# Patient Record
Sex: FEMALE | Race: BLACK OR AFRICAN AMERICAN | NOT HISPANIC OR LATINO
[De-identification: names, ages, dates, MRNs, and addresses within clinical notes are randomized per-mention and may not be internally consistent; named-entity substitution may affect disease eponyms.]

---

## 2021-04-14 ENCOUNTER — NON-APPOINTMENT (OUTPATIENT)
Age: 21
End: 2021-04-14

## 2021-04-14 PROBLEM — Z00.00 ENCOUNTER FOR PREVENTIVE HEALTH EXAMINATION: Status: ACTIVE | Noted: 2021-04-14

## 2021-04-15 ENCOUNTER — APPOINTMENT (OUTPATIENT)
Dept: OTHER | Facility: CLINIC | Age: 21
End: 2021-04-15
Payer: COMMERCIAL

## 2021-04-15 ENCOUNTER — TRANSCRIPTION ENCOUNTER (OUTPATIENT)
Age: 21
End: 2021-04-15

## 2021-04-15 ENCOUNTER — NON-APPOINTMENT (OUTPATIENT)
Age: 21
End: 2021-04-15

## 2021-04-15 ENCOUNTER — APPOINTMENT (OUTPATIENT)
Dept: CARDIOTHORACIC SURGERY | Facility: CLINIC | Age: 21
End: 2021-04-15
Payer: COMMERCIAL

## 2021-04-15 ENCOUNTER — OUTPATIENT (OUTPATIENT)
Dept: INPATIENT UNIT | Facility: HOSPITAL | Age: 21
LOS: 1 days | Discharge: HOME | End: 2021-04-15
Payer: COMMERCIAL

## 2021-04-15 ENCOUNTER — APPOINTMENT (OUTPATIENT)
Dept: PEDIATRIC CARDIOLOGY | Facility: CLINIC | Age: 21
End: 2021-04-15
Payer: COMMERCIAL

## 2021-04-15 ENCOUNTER — APPOINTMENT (OUTPATIENT)
Dept: OBGYN | Facility: CLINIC | Age: 21
End: 2021-04-15
Payer: COMMERCIAL

## 2021-04-15 VITALS
TEMPERATURE: 97.7 F | BODY MASS INDEX: 40.57 KG/M2 | HEIGHT: 63 IN | SYSTOLIC BLOOD PRESSURE: 126 MMHG | DIASTOLIC BLOOD PRESSURE: 90 MMHG | WEIGHT: 229 LBS

## 2021-04-15 VITALS
TEMPERATURE: 97.7 F | BODY MASS INDEX: 40.57 KG/M2 | DIASTOLIC BLOOD PRESSURE: 89 MMHG | SYSTOLIC BLOOD PRESSURE: 129 MMHG | HEART RATE: 116 BPM | WEIGHT: 229 LBS | HEIGHT: 63 IN

## 2021-04-15 VITALS — DIASTOLIC BLOOD PRESSURE: 80 MMHG | SYSTOLIC BLOOD PRESSURE: 127 MMHG | HEART RATE: 109 BPM

## 2021-04-15 VITALS — DIASTOLIC BLOOD PRESSURE: 90 MMHG | SYSTOLIC BLOOD PRESSURE: 126 MMHG

## 2021-04-15 DIAGNOSIS — Z82.49 FAMILY HISTORY OF ISCHEMIC HEART DISEASE AND OTHER DISEASES OF THE CIRCULATORY SYSTEM: ICD-10-CM

## 2021-04-15 DIAGNOSIS — Z83.3 FAMILY HISTORY OF DIABETES MELLITUS: ICD-10-CM

## 2021-04-15 DIAGNOSIS — O26.899 OTHER SPECIFIED PREGNANCY RELATED CONDITIONS, UNSPECIFIED TRIMESTER: ICD-10-CM

## 2021-04-15 DIAGNOSIS — Z78.9 OTHER SPECIFIED HEALTH STATUS: ICD-10-CM

## 2021-04-15 DIAGNOSIS — Z3A.00 WEEKS OF GESTATION OF PREGNANCY NOT SPECIFIED: ICD-10-CM

## 2021-04-15 DIAGNOSIS — H50.10 UNSPECIFIED EXOTROPIA: ICD-10-CM

## 2021-04-15 DIAGNOSIS — A60.00 HERPESVIRAL INFECTION OF UROGENITAL SYSTEM, UNSPECIFIED: ICD-10-CM

## 2021-04-15 DIAGNOSIS — H40.9 UNSPECIFIED GLAUCOMA: ICD-10-CM

## 2021-04-15 PROCEDURE — 99204 OFFICE O/P NEW MOD 45 MIN: CPT

## 2021-04-15 PROCEDURE — 76827 ECHO EXAM OF FETAL HEART: CPT

## 2021-04-15 PROCEDURE — 0501F PRENATAL FLOW SHEET: CPT

## 2021-04-15 PROCEDURE — 76825 ECHO EXAM OF FETAL HEART: CPT

## 2021-04-15 PROCEDURE — 93325 DOPPLER ECHO COLOR FLOW MAPG: CPT | Mod: 59

## 2021-04-15 PROCEDURE — 99205 OFFICE O/P NEW HI 60 MIN: CPT | Mod: 25

## 2021-04-15 PROCEDURE — 76820 UMBILICAL ARTERY ECHO: CPT

## 2021-04-15 PROCEDURE — 76821 MIDDLE CEREBRAL ARTERY ECHO: CPT

## 2021-04-15 PROCEDURE — 59025 FETAL NON-STRESS TEST: CPT | Mod: 26

## 2021-04-15 PROCEDURE — 99072 ADDL SUPL MATRL&STAF TM PHE: CPT

## 2021-04-15 PROCEDURE — 99205 OFFICE O/P NEW HI 60 MIN: CPT | Mod: 95

## 2021-04-15 PROCEDURE — 99215 OFFICE O/P EST HI 40 MIN: CPT | Mod: 25

## 2021-04-15 RX ORDER — PNV/FERROUS SULFATE/FOLIC ACID 27-<0.5MG
TABLET ORAL
Refills: 0 | Status: ACTIVE | COMMUNITY

## 2021-04-15 RX ORDER — CHROMIUM 200 MCG
TABLET ORAL
Refills: 0 | Status: ACTIVE | COMMUNITY

## 2021-04-15 NOTE — OB PROVIDER TRIAGE NOTE - NSOBPROVIDERNOTE_OBGYN_ALL_OB_FT
21y Black female  at 38w5d with normal PEC   No evidence of PEC at this time   Fetus with VSD  D/w Dr Chucrh  Discharge home with instructions  labor precautions  pt to monitor fetal kick counts  pt to follow up with OB as scheduled  Pt to increase Po hydration 21y Black female  at 38w5d with normal PEC   No evidence of PEC at this time   Fetus with VSD  D/w Dr Kapoor  Discharge home with instructions  labor precautions  pt to monitor fetal kick counts  pt to follow up with OB as scheduled  Patient to keep ATU appt scheduled for today  Pt to increase Po hydration

## 2021-04-15 NOTE — OB PROVIDER TRIAGE NOTE - NSHPLABSRESULTS_GEN_ALL_CORE
10.2   10.14 )-----------( 269      ( 2021 00:52 )             31.9         136  |  103  |  3<L>  ----------------------------<  82  3.5   |  20<L>  |  0.62    Ca    9.1      2021 00:52    TPro  6.6  /  Alb  3.4  /  TBili  0.5  /  DBili  x   /  AST  18  /  ALT  5   /  AlkPhos  155<H>      I&O's Detail    PT/INR - ( 2021 00:52 )   PT: 11.7 sec;   INR: 1.03 ratio         PTT - ( 2021 00:52 )  PTT:27.1 sec  Urinalysis Basic - ( 2021 00:52 )    Color: Yellow / Appearance: Clear / S.034 / pH: x  Gluc: x / Ketone: Moderate  / Bili: Small / Urobili: 6 mg/dL   Blood: x / Protein: 100 mg/dL / Nitrite: Negative   Leuk Esterase: Negative / RBC: 2 /HPF / WBC 4 /HPF   Sq Epi: x / Non Sq Epi: 6 /HPF / Bacteria: Few

## 2021-04-15 NOTE — OB PROVIDER TRIAGE NOTE - ADDITIONAL INSTRUCTIONS
D/w Dr Church  Discharge home with instructions  labor precautions  pt to monitor fetal kick counts  pt to follow up with OB as scheduled  Pt to increase Po hydration

## 2021-04-15 NOTE — OB PROVIDER TRIAGE NOTE - HISTORY OF PRESENT ILLNESS
21y Black female  at 38w5d sent to triage from OB for evaluation due to BP of 126/90 with mild headache and lower extremeties swelling.    Pt was a late transfer to [facility] due to fetal cardiac abnormality: Fetus with VSD. Presently pt states headache has resolved.  Denies any visual changes, no epigastric pain, no nausea, no vomiting.  Reports +FM, no vaginal bleeding, no ROM or LOF  Prenatal care: [facility]

## 2021-04-15 NOTE — OB RN TRIAGE NOTE - NSMATERNALFETALCONCERNS_OBGYN_ALL_OB_FT
FETAL ALERT  late transfer of care at 38 weeks secondary to CHD - SEE FETAL ECHO FROM [facility] ( 4/15/2021)  alert NICU

## 2021-04-15 NOTE — OB PROVIDER TRIAGE NOTE - NSMATERNALFETALCONCERNS_OBGYN_ALL_OB_FT
FETAL ALERT  late transfer of care at 38 weeks secondary to CHD - SEE FETAL ECHO FROM [facility] [place] ( 4/15/2021)  alert NICU

## 2021-04-15 NOTE — OB RN TRIAGE NOTE - CHIEF COMPLAINT QUOTE
Pt. was referred from MD's office to R/O PEC . BP at the office was 126/90 with mild headache , protein in the urine and swelling of the lower extremities .

## 2021-04-15 NOTE — OB PROVIDER TRIAGE NOTE - NSHPPHYSICALEXAM_GEN_ALL_CORE
HR: 83 (04-16-21 @ 02:59) (78 - 109)  BP: 111/68 (04-16-21 @ 02:59) (103/57 - 127/80)  RR: 16 (04-15-21 @ 23:55) (16 - 16)    Heart: RRR  Lungs: CTA  Abdomen: Gravid, soft, NT  Lower extremeties: +2 pitting edema    NST: Reactive with moderate variability, Category 1 tracing  [place]: Occasional contractions

## 2021-04-16 ENCOUNTER — ASOB RESULT (OUTPATIENT)
Age: 21
End: 2021-04-16

## 2021-04-16 ENCOUNTER — APPOINTMENT (OUTPATIENT)
Dept: ANTEPARTUM | Facility: CLINIC | Age: 21
End: 2021-04-16
Payer: COMMERCIAL

## 2021-04-16 ENCOUNTER — APPOINTMENT (OUTPATIENT)
Dept: ANTEPARTUM | Facility: HOSPITAL | Age: 21
End: 2021-04-16

## 2021-04-16 VITALS — SYSTOLIC BLOOD PRESSURE: 118 MMHG | DIASTOLIC BLOOD PRESSURE: 67 MMHG | HEART RATE: 109 BPM

## 2021-04-16 DIAGNOSIS — Z98.890 OTHER SPECIFIED POSTPROCEDURAL STATES: Chronic | ICD-10-CM

## 2021-04-16 LAB
ALBUMIN SERPL ELPH-MCNC: 3.4 G/DL — SIGNIFICANT CHANGE UP (ref 3.3–5)
ALP SERPL-CCNC: 155 U/L — HIGH (ref 40–120)
ALT FLD-CCNC: 5 U/L — SIGNIFICANT CHANGE UP (ref 4–33)
ANION GAP SERPL CALC-SCNC: 13 MMOL/L — SIGNIFICANT CHANGE UP (ref 7–14)
APPEARANCE UR: CLEAR — SIGNIFICANT CHANGE UP
APTT BLD: 27.1 SEC — SIGNIFICANT CHANGE UP (ref 27–36.3)
AST SERPL-CCNC: 18 U/L — SIGNIFICANT CHANGE UP (ref 4–32)
BACTERIA # UR AUTO: ABNORMAL
BASOPHILS # BLD AUTO: 0.02 K/UL — SIGNIFICANT CHANGE UP (ref 0–0.2)
BASOPHILS NFR BLD AUTO: 0.2 % — SIGNIFICANT CHANGE UP (ref 0–2)
BILIRUB SERPL-MCNC: 0.5 MG/DL — SIGNIFICANT CHANGE UP (ref 0.2–1.2)
BILIRUB UR-MCNC: ABNORMAL
BUN SERPL-MCNC: 3 MG/DL — LOW (ref 7–23)
CALCIUM SERPL-MCNC: 9.1 MG/DL — SIGNIFICANT CHANGE UP (ref 8.4–10.5)
CHLORIDE SERPL-SCNC: 103 MMOL/L — SIGNIFICANT CHANGE UP (ref 98–107)
CO2 SERPL-SCNC: 20 MMOL/L — LOW (ref 22–31)
COLOR SPEC: YELLOW — SIGNIFICANT CHANGE UP
CREAT ?TM UR-MCNC: 420 MG/DL — SIGNIFICANT CHANGE UP
CREAT SERPL-MCNC: 0.62 MG/DL — SIGNIFICANT CHANGE UP (ref 0.5–1.3)
DIFF PNL FLD: NEGATIVE — SIGNIFICANT CHANGE UP
EOSINOPHIL # BLD AUTO: 0.06 K/UL — SIGNIFICANT CHANGE UP (ref 0–0.5)
EOSINOPHIL NFR BLD AUTO: 0.6 % — SIGNIFICANT CHANGE UP (ref 0–6)
EPI CELLS # UR: 6 /HPF — HIGH (ref 0–5)
FIBRINOGEN PPP-MCNC: 581 MG/DL — HIGH (ref 290–520)
GLUCOSE SERPL-MCNC: 82 MG/DL — SIGNIFICANT CHANGE UP (ref 70–99)
GLUCOSE UR QL: NEGATIVE — SIGNIFICANT CHANGE UP
HCT VFR BLD CALC: 31.9 % — LOW (ref 34.5–45)
HGB BLD-MCNC: 10.2 G/DL — LOW (ref 11.5–15.5)
HYALINE CASTS # UR AUTO: 1 /LPF — SIGNIFICANT CHANGE UP (ref 0–7)
IANC: 7.44 K/UL — SIGNIFICANT CHANGE UP (ref 1.5–8.5)
IMM GRANULOCYTES NFR BLD AUTO: 0.4 % — SIGNIFICANT CHANGE UP (ref 0–1.5)
INR BLD: 1.03 RATIO — SIGNIFICANT CHANGE UP (ref 0.88–1.16)
KETONES UR-MCNC: ABNORMAL
LDH SERPL L TO P-CCNC: 219 U/L — SIGNIFICANT CHANGE UP (ref 135–225)
LEUKOCYTE ESTERASE UR-ACNC: NEGATIVE — SIGNIFICANT CHANGE UP
LYMPHOCYTES # BLD AUTO: 1.62 K/UL — SIGNIFICANT CHANGE UP (ref 1–3.3)
LYMPHOCYTES # BLD AUTO: 16 % — SIGNIFICANT CHANGE UP (ref 13–44)
MCHC RBC-ENTMCNC: 25.1 PG — LOW (ref 27–34)
MCHC RBC-ENTMCNC: 32 GM/DL — SIGNIFICANT CHANGE UP (ref 32–36)
MCV RBC AUTO: 78.4 FL — LOW (ref 80–100)
MONOCYTES # BLD AUTO: 0.96 K/UL — HIGH (ref 0–0.9)
MONOCYTES NFR BLD AUTO: 9.5 % — SIGNIFICANT CHANGE UP (ref 2–14)
NEUTROPHILS # BLD AUTO: 7.44 K/UL — HIGH (ref 1.8–7.4)
NEUTROPHILS NFR BLD AUTO: 73.3 % — SIGNIFICANT CHANGE UP (ref 43–77)
NITRITE UR-MCNC: NEGATIVE — SIGNIFICANT CHANGE UP
NRBC # BLD: 0 /100 WBCS — SIGNIFICANT CHANGE UP
NRBC # FLD: 0.02 K/UL — HIGH
PH UR: 6.5 — SIGNIFICANT CHANGE UP (ref 5–8)
PLATELET # BLD AUTO: 269 K/UL — SIGNIFICANT CHANGE UP (ref 150–400)
POTASSIUM SERPL-MCNC: 3.5 MMOL/L — SIGNIFICANT CHANGE UP (ref 3.5–5.3)
POTASSIUM SERPL-SCNC: 3.5 MMOL/L — SIGNIFICANT CHANGE UP (ref 3.5–5.3)
PROT ?TM UR-MCNC: 66 MG/DL — SIGNIFICANT CHANGE UP
PROT ?TM UR-MCNC: 66 MG/DL — SIGNIFICANT CHANGE UP
PROT SERPL-MCNC: 6.6 G/DL — SIGNIFICANT CHANGE UP (ref 6–8.3)
PROT UR-MCNC: ABNORMAL
PROT/CREAT UR-RTO: 0.2 RATIO — SIGNIFICANT CHANGE UP (ref 0–0.2)
PROTHROM AB SERPL-ACNC: 11.7 SEC — SIGNIFICANT CHANGE UP (ref 10.6–13.6)
RBC # BLD: 4.07 M/UL — SIGNIFICANT CHANGE UP (ref 3.8–5.2)
RBC # FLD: 13.6 % — SIGNIFICANT CHANGE UP (ref 10.3–14.5)
RBC CASTS # UR COMP ASSIST: 2 /HPF — SIGNIFICANT CHANGE UP (ref 0–4)
SODIUM SERPL-SCNC: 136 MMOL/L — SIGNIFICANT CHANGE UP (ref 135–145)
SP GR SPEC: 1.03 — HIGH (ref 1.01–1.02)
URATE SERPL-MCNC: 5.8 MG/DL — SIGNIFICANT CHANGE UP (ref 2.5–7)
UROBILINOGEN FLD QL: ABNORMAL
WBC # BLD: 10.14 K/UL — SIGNIFICANT CHANGE UP (ref 3.8–10.5)
WBC # FLD AUTO: 10.14 K/UL — SIGNIFICANT CHANGE UP (ref 3.8–10.5)
WBC UR QL: 4 /HPF — SIGNIFICANT CHANGE UP (ref 0–5)

## 2021-04-16 PROCEDURE — 76819 FETAL BIOPHYS PROFIL W/O NST: CPT

## 2021-04-16 PROCEDURE — 99202 OFFICE O/P NEW SF 15 MIN: CPT | Mod: 25

## 2021-04-16 PROCEDURE — 99072 ADDL SUPL MATRL&STAF TM PHE: CPT

## 2021-04-16 PROCEDURE — 76805 OB US >/= 14 WKS SNGL FETUS: CPT

## 2021-04-16 NOTE — DATA REVIEWED
[FreeTextEntry1] : fetal echo repeated here and reviewed with Dr. Chapin and Dr. Neri\par summary (full report in [employer])\par double outlet right ventricle\par transposition of the great arteries\par VSD inlet with some extension to the subpulmonary area, but not immediately subpulmonary\par second posterior muscular VSD\par LV hyposplasia\par RV dilation\par arch obstruction cannot be ruled out

## 2021-04-16 NOTE — CONSULT LETTER
[Consult Letter:] : I had the pleasure of evaluating your patient, [unfilled]. [Please see my note below.] : Please see my note below. [Consult Closing:] : Thank you very much for allowing me to participate in the care of this patient.  If you have any questions, please do not hesitate to contact me. [Sincerely,] : Sincerely, [Dear  ___] : Dear  [unfilled], [FreeTextEntry2] : April 15, 2021\par \par David Truong MD\par Fax: [phone] [FreeTextEntry3] : William Quinteros MD\par \par Cardiothoracic Surgery and Pediatrics\par [facility]\par \par CC: Maternal Fetal Medicine\par Milly Chapin MD\par Pricilla Neri MD

## 2021-04-16 NOTE — ASSESSMENT
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  The images are limited due to the advanced point in the pregnancy, and of course full evaluation after birth will be needed to clarify all the anatomic details.\par \par Approximately half of kids with this lesion will have associated coarctation and arch hypoplasia, so there is a good chance prostin will be needed and arch repair added to the initial operation.  If there is arch obstruction then surgery will likely be undertaken in the  period.  Septostomy is less likely to be needed.\par \par Most of the decision making in double outlet right ventricle revolves around the location of the VSD and its relationship to the aorta.  If there is enough VSD under the pulmonary artery, then an arterial switch can be done along with baffling the VSD to the carlos alberto-aorta, along with an arch repair if needed.  If the VSD is too remote from either root, then it may not be possible to septate the ventricles and single ventricle palliation will be needed.  This is a critical and often complex decision making process, because while its tempting to think that an ill-advised attempt at septation can be converted to single ventricle, typically the babies are ill enough at the point that transition is required that they won't tolerate single ventricle palliation and they don't survive.\par \par Thus while it can be disappointing to send a child with 2 ventricles down a single ventricle pathway, there is ample evidence that this is preferable than attempting to septate a non-septatable DORV.\par \par There are several reasons that we may consider staged repair instead of the usual single stage repair.  The VSD may be difficult to figure out and to close.  The left ventricle is small as is the mitral.  There is at least one additional VSD.  I suspect I will favor an initial palliative procedure that allows us to decide about septation and to deal with the VSD later, as an isolated procedure and in a larger child.\par \par I would think that the initial procedure should include arch repair if needed, and also an arterial switch, bringing the aorta closer to the VSD and avoiding significant transposition physiology and cyanosis.  We would probably band the PA reconstruction to regulate pulmonary blood flow. \par \par All the potential procedural options here are complex and have potential for significant morbidity.  The plan will be refined as more anatomic certainty is reached.\par \par I explained these considerations to the patient and her mother as best I could.  We look forward to caring for this very nice family.

## 2021-04-16 NOTE — HISTORY OF PRESENT ILLNESS
[FreeTextEntry1] : This patient is referred by Dr. David Truong after his fetal echo of her 38wk fetus showed TGA and DORV.  There is no family history.  This will be her first child.  She is otherwise healthy.  Obstetrical care is being transferred to [facility].

## 2021-04-19 ENCOUNTER — NON-APPOINTMENT (OUTPATIENT)
Age: 21
End: 2021-04-19

## 2021-04-19 ENCOUNTER — APPOINTMENT (OUTPATIENT)
Dept: OBGYN | Facility: CLINIC | Age: 21
End: 2021-04-19
Payer: COMMERCIAL

## 2021-04-19 VITALS
WEIGHT: 234 LBS | HEIGHT: 63 IN | DIASTOLIC BLOOD PRESSURE: 86 MMHG | BODY MASS INDEX: 41.46 KG/M2 | SYSTOLIC BLOOD PRESSURE: 126 MMHG

## 2021-04-19 PROBLEM — H50.10 EXOTROPIA: Status: ACTIVE | Noted: 2021-04-19

## 2021-04-19 PROBLEM — Z83.3 FAMILY HISTORY OF DIABETES MELLITUS: Status: ACTIVE | Noted: 2021-04-19

## 2021-04-19 PROBLEM — Z82.49 FAMILY HISTORY OF HYPERTENSION: Status: ACTIVE | Noted: 2021-04-19

## 2021-04-19 PROBLEM — Z78.9 NO HISTORY OF ALCOHOL USE: Status: ACTIVE | Noted: 2021-04-19

## 2021-04-19 PROBLEM — Z78.9 DOES NOT USE ILLICIT DRUGS: Status: ACTIVE | Noted: 2021-04-19

## 2021-04-19 PROBLEM — A60.00 GENITAL HERPES SIMPLEX, UNSPECIFIED SITE: Status: ACTIVE | Noted: 2021-04-19

## 2021-04-19 PROBLEM — H40.9 GLAUCOMA: Status: ACTIVE | Noted: 2021-04-19

## 2021-04-19 PROCEDURE — 0502F SUBSEQUENT PRENATAL CARE: CPT

## 2021-04-22 ENCOUNTER — APPOINTMENT (OUTPATIENT)
Dept: ANTEPARTUM | Facility: CLINIC | Age: 21
End: 2021-04-22
Payer: COMMERCIAL

## 2021-04-22 ENCOUNTER — APPOINTMENT (OUTPATIENT)
Dept: ANTEPARTUM | Facility: HOSPITAL | Age: 21
End: 2021-04-22

## 2021-04-22 ENCOUNTER — ASOB RESULT (OUTPATIENT)
Age: 21
End: 2021-04-22

## 2021-04-22 PROCEDURE — 99072 ADDL SUPL MATRL&STAF TM PHE: CPT

## 2021-04-22 PROCEDURE — 76819 FETAL BIOPHYS PROFIL W/O NST: CPT

## 2021-04-25 ENCOUNTER — INPATIENT (INPATIENT)
Facility: HOSPITAL | Age: 21
LOS: 4 days | Discharge: HOME HEALTH | End: 2021-04-30
Attending: OBSTETRICS & GYNECOLOGY | Admitting: OBSTETRICS & GYNECOLOGY
Payer: COMMERCIAL

## 2021-04-25 ENCOUNTER — TRANSCRIPTION ENCOUNTER (OUTPATIENT)
Age: 21
End: 2021-04-25

## 2021-04-25 VITALS
HEART RATE: 118 BPM | RESPIRATION RATE: 16 BRPM | TEMPERATURE: 99 F | SYSTOLIC BLOOD PRESSURE: 140 MMHG | DIASTOLIC BLOOD PRESSURE: 90 MMHG

## 2021-04-25 DIAGNOSIS — O42.90 PREMATURE RUPTURE OF MEMBRANES, UNSPECIFIED AS TO LENGTH OF TIME BETWEEN RUPTURE AND ONSET OF LABOR, UNSPECIFIED WEEKS OF GESTATION: ICD-10-CM

## 2021-04-25 DIAGNOSIS — O26.899 OTHER SPECIFIED PREGNANCY RELATED CONDITIONS, UNSPECIFIED TRIMESTER: ICD-10-CM

## 2021-04-25 DIAGNOSIS — Z98.890 OTHER SPECIFIED POSTPROCEDURAL STATES: Chronic | ICD-10-CM

## 2021-04-25 DIAGNOSIS — Z3A.00 WEEKS OF GESTATION OF PREGNANCY NOT SPECIFIED: ICD-10-CM

## 2021-04-25 LAB
ALBUMIN SERPL ELPH-MCNC: 3.5 G/DL — SIGNIFICANT CHANGE UP (ref 3.3–5)
ALP SERPL-CCNC: 186 U/L — HIGH (ref 40–120)
ALT FLD-CCNC: 5 U/L — SIGNIFICANT CHANGE UP (ref 4–33)
ANION GAP SERPL CALC-SCNC: 14 MMOL/L — SIGNIFICANT CHANGE UP (ref 7–14)
APPEARANCE UR: ABNORMAL
APTT BLD: 25.9 SEC — LOW (ref 27–36.3)
AST SERPL-CCNC: 15 U/L — SIGNIFICANT CHANGE UP (ref 4–32)
BACTERIA # UR AUTO: ABNORMAL
BASOPHILS # BLD AUTO: 0.03 K/UL — SIGNIFICANT CHANGE UP (ref 0–0.2)
BASOPHILS NFR BLD AUTO: 0.2 % — SIGNIFICANT CHANGE UP (ref 0–2)
BILIRUB SERPL-MCNC: 0.4 MG/DL — SIGNIFICANT CHANGE UP (ref 0.2–1.2)
BILIRUB UR-MCNC: NEGATIVE — SIGNIFICANT CHANGE UP
BLD GP AB SCN SERPL QL: NEGATIVE — SIGNIFICANT CHANGE UP
BUN SERPL-MCNC: 3 MG/DL — LOW (ref 7–23)
CALCIUM SERPL-MCNC: 9.1 MG/DL — SIGNIFICANT CHANGE UP (ref 8.4–10.5)
CHLORIDE SERPL-SCNC: 104 MMOL/L — SIGNIFICANT CHANGE UP (ref 98–107)
CO2 SERPL-SCNC: 21 MMOL/L — LOW (ref 22–31)
COLOR SPEC: SIGNIFICANT CHANGE UP
CREAT ?TM UR-MCNC: 100 MG/DL — SIGNIFICANT CHANGE UP
CREAT SERPL-MCNC: 0.57 MG/DL — SIGNIFICANT CHANGE UP (ref 0.5–1.3)
DIFF PNL FLD: ABNORMAL
EOSINOPHIL # BLD AUTO: 0.06 K/UL — SIGNIFICANT CHANGE UP (ref 0–0.5)
EOSINOPHIL NFR BLD AUTO: 0.5 % — SIGNIFICANT CHANGE UP (ref 0–6)
EPI CELLS # UR: 5 /HPF — SIGNIFICANT CHANGE UP (ref 0–5)
FIBRINOGEN PPP-MCNC: 592 MG/DL — HIGH (ref 290–520)
GLUCOSE SERPL-MCNC: 96 MG/DL — SIGNIFICANT CHANGE UP (ref 70–99)
GLUCOSE UR QL: NEGATIVE — SIGNIFICANT CHANGE UP
HCT VFR BLD CALC: 30.2 % — LOW (ref 34.5–45)
HGB BLD-MCNC: 9.6 G/DL — LOW (ref 11.5–15.5)
HYALINE CASTS # UR AUTO: 0 /LPF — SIGNIFICANT CHANGE UP (ref 0–7)
IANC: 9.99 K/UL — HIGH (ref 1.5–8.5)
IMM GRANULOCYTES NFR BLD AUTO: 0.5 % — SIGNIFICANT CHANGE UP (ref 0–1.5)
INR BLD: 1.03 RATIO — SIGNIFICANT CHANGE UP (ref 0.88–1.16)
KETONES UR-MCNC: ABNORMAL
LDH SERPL L TO P-CCNC: 260 U/L — HIGH (ref 135–225)
LEUKOCYTE ESTERASE UR-ACNC: NEGATIVE — SIGNIFICANT CHANGE UP
LYMPHOCYTES # BLD AUTO: 1.33 K/UL — SIGNIFICANT CHANGE UP (ref 1–3.3)
LYMPHOCYTES # BLD AUTO: 10.7 % — LOW (ref 13–44)
MCHC RBC-ENTMCNC: 24.7 PG — LOW (ref 27–34)
MCHC RBC-ENTMCNC: 31.8 GM/DL — LOW (ref 32–36)
MCV RBC AUTO: 77.6 FL — LOW (ref 80–100)
MONOCYTES # BLD AUTO: 0.93 K/UL — HIGH (ref 0–0.9)
MONOCYTES NFR BLD AUTO: 7.5 % — SIGNIFICANT CHANGE UP (ref 2–14)
NEUTROPHILS # BLD AUTO: 9.99 K/UL — HIGH (ref 1.8–7.4)
NEUTROPHILS NFR BLD AUTO: 80.6 % — HIGH (ref 43–77)
NITRITE UR-MCNC: NEGATIVE — SIGNIFICANT CHANGE UP
NRBC # BLD: 0 /100 WBCS — SIGNIFICANT CHANGE UP
NRBC # FLD: 0.02 K/UL — HIGH
PH UR: 6.5 — SIGNIFICANT CHANGE UP (ref 5–8)
PLATELET # BLD AUTO: 270 K/UL — SIGNIFICANT CHANGE UP (ref 150–400)
POTASSIUM SERPL-MCNC: 3.4 MMOL/L — LOW (ref 3.5–5.3)
POTASSIUM SERPL-SCNC: 3.4 MMOL/L — LOW (ref 3.5–5.3)
PROT ?TM UR-MCNC: 12 MG/DL — SIGNIFICANT CHANGE UP
PROT ?TM UR-MCNC: 12 MG/DL — SIGNIFICANT CHANGE UP
PROT SERPL-MCNC: 6.4 G/DL — SIGNIFICANT CHANGE UP (ref 6–8.3)
PROT UR-MCNC: NEGATIVE — SIGNIFICANT CHANGE UP
PROT/CREAT UR-RTO: 0.1 RATIO — SIGNIFICANT CHANGE UP (ref 0–0.2)
PROTHROM AB SERPL-ACNC: 11.8 SEC — SIGNIFICANT CHANGE UP (ref 10.6–13.6)
RBC # BLD: 3.89 M/UL — SIGNIFICANT CHANGE UP (ref 3.8–5.2)
RBC # FLD: 14.2 % — SIGNIFICANT CHANGE UP (ref 10.3–14.5)
RBC CASTS # UR COMP ASSIST: 1 /HPF — SIGNIFICANT CHANGE UP (ref 0–4)
RH IG SCN BLD-IMP: POSITIVE — SIGNIFICANT CHANGE UP
RH IG SCN BLD-IMP: POSITIVE — SIGNIFICANT CHANGE UP
SARS-COV-2 RNA SPEC QL NAA+PROBE: SIGNIFICANT CHANGE UP
SODIUM SERPL-SCNC: 139 MMOL/L — SIGNIFICANT CHANGE UP (ref 135–145)
SP GR SPEC: 1.01 — SIGNIFICANT CHANGE UP (ref 1.01–1.02)
URATE SERPL-MCNC: 5.4 MG/DL — SIGNIFICANT CHANGE UP (ref 2.5–7)
UROBILINOGEN FLD QL: SIGNIFICANT CHANGE UP
WBC # BLD: 12.4 K/UL — HIGH (ref 3.8–10.5)
WBC # FLD AUTO: 12.4 K/UL — HIGH (ref 3.8–10.5)
WBC UR QL: 5 /HPF — SIGNIFICANT CHANGE UP (ref 0–5)

## 2021-04-25 RX ORDER — SODIUM CHLORIDE 9 MG/ML
1000 INJECTION, SOLUTION INTRAVENOUS
Refills: 0 | Status: DISCONTINUED | OUTPATIENT
Start: 2021-04-25 | End: 2021-04-26

## 2021-04-25 RX ORDER — OXYTOCIN 10 UNIT/ML
333.33 VIAL (ML) INJECTION
Qty: 20 | Refills: 0 | Status: DISCONTINUED | OUTPATIENT
Start: 2021-04-25 | End: 2021-04-27

## 2021-04-25 RX ORDER — OXYTOCIN 10 UNIT/ML
2 VIAL (ML) INJECTION
Qty: 30 | Refills: 0 | Status: DISCONTINUED | OUTPATIENT
Start: 2021-04-25 | End: 2021-04-26

## 2021-04-25 RX ADMIN — Medication 2 MILLIUNIT(S)/MIN: at 22:52

## 2021-04-25 NOTE — OB PROVIDER H&P - ASSESSMENT
This is a 21 year old  at 40.0 weeks admitted for ROM    Plan of care discussed with dr chau  admit for SROM  HELLP labs drawn  plan to await labor- recheck in 2 hours  COVID swab collected and sent   routine orders

## 2021-04-25 NOTE — OB RN PATIENT PROFILE - NSMATERNALFETALCONCERNS_OBGYN_ALL_OB_FT
FETAL ALERT  late transfer of care at 38 weeks secondary to CHD - [place] [facility] [place]  alert NICU, RIGHT renal dilation and mild calyceal dilation on ATU sonogram (2021)   4.16. Fetal Alert Addendum - DORV / D-TGA / VSD.  inform pediatric cardiology for an urgent post  echocardiogram immediately after birth in the  intensive care unit to determine the status of the aortic arch for coarctation. Prostaglandin E1 may be kept available at bedside to start only if coarctation is confirmed. This baby will likely have transposition physiology and may need a balloon atrial septostomy based on the saturation after birth to enhance mixing. Jojo Palmer RN,MSN

## 2021-04-25 NOTE — OB RN TRIAGE NOTE - CHIEF COMPLAINT QUOTE
Pt. was referred from MD's office to R/O PEC . BP at the office was 126/90 with mild headache , protein in the urine and swelling of the lower extremities . leaking fluid since 12

## 2021-04-25 NOTE — OB PROVIDER H&P - HISTORY OF PRESENT ILLNESS
This is a 21 year old pt of Dr Batres  at 40.0 weeks gestational age presents with complaints of leaking of clear fluid since 12am. reports +GFM, denies VB, reports mild irregular contractions.  Denies fever, cough, SOB, recent exposure or illness to COVID.  Pt was a late transfer to [facility] on 4/15.  AP course complicated by:  FETAL ALERT:   late transfer of care at 38 weeks secondary to CHD - SEE FETAL ECHO FROM [facility] [place] ( 4/15/2021)  alert NICU, RIGHT renal dilation and mild calyceal dilation on ATU sonogram (2021)   4.16.21 Fetal Alert Addendum - DORV / D-TGA / VSD.  inform pediatric cardiology for an urgent post  echocardiogram immediately after birth in the  intensive care unit to determine the status of the aortic arch for coarctation. Prostaglandin E1 may be kept available at bedside to start only if coarctation is confirmed. This baby will likely have transposition physiology and may need a balloon atrial septostomy based on the saturation after birth to enhance mixing. Jojo Palmer RN,MSN    - Pt sent to DT for r/o PEC on 4/15 due to reports of left eye swelling and lower extremity edema. HELLP labs wnl   GBS  neg 3/31    med: left eye glaucoma, no meds  surg: D&C  gyn: top x1  OB: denies   current medS: pnv, iron, folic acid  NKDA

## 2021-04-25 NOTE — OB PROVIDER TRIAGE NOTE - NSMATERNALFETALCONCERNS_OBGYN_ALL_OB_FT
FETAL ALERT  late transfer of care at 38 weeks secondary to CHD - SEE FETAL ECHO FROM [facility] [place] ( 4/15/2021)  alert NICU, RIGHT renal dilation and mild calyceal dilation on ATU sonogram (2021)   4.16. Fetal Alert Addendum - DORV / D-TGA / VSD.  inform pediatric cardiology for an urgent post  echocardiogram immediately after birth in the  intensive care unit to determine the status of the aortic arch for coarctation. Prostaglandin E1 may be kept available at bedside to start only if coarctation is confirmed. This baby will likely have transposition physiology and may need a balloon atrial septostomy based on the saturation after birth to enhance mixing. Jojo Palmer RN,MSN

## 2021-04-25 NOTE — OB PROVIDER H&P - NSMATERNALFETALCONCERNS_OBGYN_ALL_OB_FT
FETAL ALERT  late transfer of care at 38 weeks secondary to CHD - [place] [facility] [place])  alert NICU, RIGHT renal dilation and mild calyceal dilation on ATU sonogram (2021)   4.16. Fetal Alert Addendum - DORV / D-TGA / VSD.  inform pediatric cardiology for an urgent post  echocardiogram immediately after birth in the  intensive care unit to determine the status of the aortic arch for coarctation. Prostaglandin E1 may be kept available at bedside to start only if coarctation is confirmed. This baby will likely have transposition physiology and may need a balloon atrial septostomy based on the saturation after birth to enhance mixing. Jojo aPlmer RN,MSN

## 2021-04-25 NOTE — OB RN TRIAGE NOTE - PMH
<<----- Click to add NO pertinent Past Medical History No pertinent past medical history   Glaucoma suspect of left eye

## 2021-04-25 NOTE — OB RN PATIENT PROFILE - PMH
Glaucoma suspect of left eye     Glaucoma suspect of left eye    Herpes  on valtrex 500mg since 3/31/21

## 2021-04-25 NOTE — OB PROVIDER TRIAGE NOTE - HISTORY OF PRESENT ILLNESS
This is a 21 year old pt of Dr Batres  at 40.0 weeks gestational age presents with complaints of leaking of clear fluid since 12am. reports +GFM, denies VB, reports mild irregular contractions.  Denies fever, cough, SOB, recent exposure or illness to COVID.  Pt was a late transfer to [facility] on 4/15.  AP course complicated by:  FETAL ALERT:   late transfer of care at 38 weeks secondary to CHD - [place] [facility] [place]  alert NICU, RIGHT renal dilation and mild calyceal dilation on ATU sonogram (2021)   4.16.21 Fetal Alert Addendum - DORV / D-TGA / VSD.  inform pediatric cardiology for an urgent post  echocardiogram immediately after birth in the  intensive care unit to determine the status of the aortic arch for coarctation. Prostaglandin E1 may be kept available at bedside to start only if coarctation is confirmed. This baby will likely have transposition physiology and may need a balloon atrial septostomy based on the saturation after birth to enhance mixing. Jojo Palmer RN,MSN    - Pt sent to DT for r/o PEC on 4/15 due to reports of left eye swelling and lower extremity edema. HELLP labs wnl   GBS  neg 3/31    med: left eye glaucoma, no meds  surg: D&C  gyn: top x1  OB: denies   current medS: pnv, iron, folic acid  NKDA

## 2021-04-25 NOTE — OB PROVIDER TRIAGE NOTE - NSHPPHYSICALEXAM_GEN_ALL_CORE
Vital Signs Last 24 Hrs  T(C): 37.2 (25 Apr 2021 17:23), Max: 37.2 (25 Apr 2021 17:11)  T(F): 98.96 (25 Apr 2021 17:23), Max: 99 (25 Apr 2021 17:11)  HR: 103 (25 Apr 2021 17:48) (98 - 118)  BP: 121/71 (25 Apr 2021 17:44) (121/71 - 140/90)  BP(mean): --  RR: 16 (25 Apr 2021 17:11) (16 - 16)  SpO2: 100% (25 Apr 2021 17:48) (99% - 100%)    A&O x3  CTAB  abdomen: gravid, soft, nontender  sse: + pooling + nitrazine + fern  sve 3/80/-2  NST: 125 baseline, moderate variabilty, positive accels, no decels, mild irregular contractions

## 2021-04-25 NOTE — OB RN TRIAGE NOTE - NSMATERNALFETALCONCERNS_OBGYN_ALL_OB_FT
FETAL ALERT  late transfer of care at 38 weeks secondary to CHD - SEE FETAL ECHO FROM [facility] [place] ( 4/15/2021)  alert NICU, RIGHT renal dilation and mild calyceal dilation on ATU sonogram (2021)   4.16. Fetal Alert Addendum - DORV / D-TGA / VSD.  inform pediatric cardiology for an urgent post  echocardiogram immediately after birth in the  intensive care unit to determine the status of the aortic arch for coarctation. Prostaglandin E1 may be kept available at bedside to start only if coarctation is confirmed. This baby will likely have transposition physiology and may need a balloon atrial septostomy based on the saturation after birth to enhance mixing. Jojo Palmer RN,MSN

## 2021-04-26 ENCOUNTER — TRANSCRIPTION ENCOUNTER (OUTPATIENT)
Age: 21
End: 2021-04-26

## 2021-04-26 ENCOUNTER — APPOINTMENT (OUTPATIENT)
Dept: OBGYN | Facility: CLINIC | Age: 21
End: 2021-04-26

## 2021-04-26 LAB
ALBUMIN SERPL ELPH-MCNC: 3.2 G/DL — LOW (ref 3.3–5)
ALP SERPL-CCNC: 181 U/L — HIGH (ref 40–120)
ALT FLD-CCNC: <5 U/L — LOW (ref 4–33)
ANION GAP SERPL CALC-SCNC: 11 MMOL/L — SIGNIFICANT CHANGE UP (ref 7–14)
APTT BLD: 26.8 SEC — LOW (ref 27–36.3)
AST SERPL-CCNC: 13 U/L — SIGNIFICANT CHANGE UP (ref 4–32)
BASOPHILS # BLD AUTO: 0.03 K/UL — SIGNIFICANT CHANGE UP (ref 0–0.2)
BASOPHILS NFR BLD AUTO: 0.3 % — SIGNIFICANT CHANGE UP (ref 0–2)
BILIRUB SERPL-MCNC: 0.4 MG/DL — SIGNIFICANT CHANGE UP (ref 0.2–1.2)
BUN SERPL-MCNC: 4 MG/DL — LOW (ref 7–23)
CALCIUM SERPL-MCNC: 9.2 MG/DL — SIGNIFICANT CHANGE UP (ref 8.4–10.5)
CHLORIDE SERPL-SCNC: 105 MMOL/L — SIGNIFICANT CHANGE UP (ref 98–107)
CO2 SERPL-SCNC: 22 MMOL/L — SIGNIFICANT CHANGE UP (ref 22–31)
COVID-19 SPIKE DOMAIN AB INTERP: NEGATIVE — SIGNIFICANT CHANGE UP
COVID-19 SPIKE DOMAIN ANTIBODY RESULT: 0.4 U/ML — SIGNIFICANT CHANGE UP
CREAT SERPL-MCNC: 0.6 MG/DL — SIGNIFICANT CHANGE UP (ref 0.5–1.3)
EOSINOPHIL # BLD AUTO: 0.07 K/UL — SIGNIFICANT CHANGE UP (ref 0–0.5)
EOSINOPHIL NFR BLD AUTO: 0.6 % — SIGNIFICANT CHANGE UP (ref 0–6)
FIBRINOGEN PPP-MCNC: 584 MG/DL — HIGH (ref 290–520)
GLUCOSE SERPL-MCNC: 83 MG/DL — SIGNIFICANT CHANGE UP (ref 70–99)
HBV SURFACE AG SERPL QL IA: SIGNIFICANT CHANGE UP
HCT VFR BLD CALC: 30.9 % — LOW (ref 34.5–45)
HGB BLD-MCNC: 9.6 G/DL — LOW (ref 11.5–15.5)
IANC: 9.05 K/UL — HIGH (ref 1.5–8.5)
IMM GRANULOCYTES NFR BLD AUTO: 0.5 % — SIGNIFICANT CHANGE UP (ref 0–1.5)
INR BLD: 1.05 RATIO — SIGNIFICANT CHANGE UP (ref 0.88–1.16)
LDH SERPL L TO P-CCNC: 197 U/L — SIGNIFICANT CHANGE UP (ref 135–225)
LYMPHOCYTES # BLD AUTO: 1.54 K/UL — SIGNIFICANT CHANGE UP (ref 1–3.3)
LYMPHOCYTES # BLD AUTO: 13 % — SIGNIFICANT CHANGE UP (ref 13–44)
MCHC RBC-ENTMCNC: 24.7 PG — LOW (ref 27–34)
MCHC RBC-ENTMCNC: 31.1 GM/DL — LOW (ref 32–36)
MCV RBC AUTO: 79.6 FL — LOW (ref 80–100)
MONOCYTES # BLD AUTO: 1.14 K/UL — HIGH (ref 0–0.9)
MONOCYTES NFR BLD AUTO: 9.6 % — SIGNIFICANT CHANGE UP (ref 2–14)
NEUTROPHILS # BLD AUTO: 9.05 K/UL — HIGH (ref 1.8–7.4)
NEUTROPHILS NFR BLD AUTO: 76 % — SIGNIFICANT CHANGE UP (ref 43–77)
NRBC # BLD: 0 /100 WBCS — SIGNIFICANT CHANGE UP
NRBC # FLD: 0 K/UL — SIGNIFICANT CHANGE UP
PLATELET # BLD AUTO: 257 K/UL — SIGNIFICANT CHANGE UP (ref 150–400)
POTASSIUM SERPL-MCNC: 3.7 MMOL/L — SIGNIFICANT CHANGE UP (ref 3.5–5.3)
POTASSIUM SERPL-SCNC: 3.7 MMOL/L — SIGNIFICANT CHANGE UP (ref 3.5–5.3)
PROT SERPL-MCNC: 6.5 G/DL — SIGNIFICANT CHANGE UP (ref 6–8.3)
PROTHROM AB SERPL-ACNC: 11.9 SEC — SIGNIFICANT CHANGE UP (ref 10.6–13.6)
RBC # BLD: 3.88 M/UL — SIGNIFICANT CHANGE UP (ref 3.8–5.2)
RBC # FLD: 14.4 % — SIGNIFICANT CHANGE UP (ref 10.3–14.5)
RUBV IGG SER-ACNC: 3.6 INDEX — SIGNIFICANT CHANGE UP
RUBV IGG SER-IMP: POSITIVE — SIGNIFICANT CHANGE UP
SARS-COV-2 IGG+IGM SERPL QL IA: 0.4 U/ML — SIGNIFICANT CHANGE UP
SARS-COV-2 IGG+IGM SERPL QL IA: NEGATIVE — SIGNIFICANT CHANGE UP
SODIUM SERPL-SCNC: 138 MMOL/L — SIGNIFICANT CHANGE UP (ref 135–145)
T PALLIDUM AB TITR SER: NEGATIVE — SIGNIFICANT CHANGE UP
URATE SERPL-MCNC: 5.1 MG/DL — SIGNIFICANT CHANGE UP (ref 2.5–7)
WBC # BLD: 11.89 K/UL — HIGH (ref 3.8–10.5)
WBC # FLD AUTO: 11.89 K/UL — HIGH (ref 3.8–10.5)

## 2021-04-26 PROCEDURE — 59515 CESAREAN DELIVERY: CPT | Mod: U9,UB,GC

## 2021-04-26 RX ORDER — OXYTOCIN 10 UNIT/ML
333.33 VIAL (ML) INJECTION
Qty: 20 | Refills: 0 | Status: DISCONTINUED | OUTPATIENT
Start: 2021-04-26 | End: 2021-04-27

## 2021-04-26 RX ORDER — FAMOTIDINE 10 MG/ML
20 INJECTION INTRAVENOUS ONCE
Refills: 0 | Status: DISCONTINUED | OUTPATIENT
Start: 2021-04-26 | End: 2021-04-26

## 2021-04-26 RX ORDER — IBUPROFEN 200 MG
600 TABLET ORAL EVERY 6 HOURS
Refills: 0 | Status: DISCONTINUED | OUTPATIENT
Start: 2021-04-26 | End: 2021-04-29

## 2021-04-26 RX ORDER — LANOLIN
1 OINTMENT (GRAM) TOPICAL EVERY 6 HOURS
Refills: 0 | Status: DISCONTINUED | OUTPATIENT
Start: 2021-04-26 | End: 2021-04-30

## 2021-04-26 RX ORDER — SODIUM CHLORIDE 9 MG/ML
1000 INJECTION, SOLUTION INTRAVENOUS ONCE
Refills: 0 | Status: COMPLETED | OUTPATIENT
Start: 2021-04-26 | End: 2021-04-26

## 2021-04-26 RX ORDER — DIPHENHYDRAMINE HCL 50 MG
25 CAPSULE ORAL EVERY 6 HOURS
Refills: 0 | Status: DISCONTINUED | OUTPATIENT
Start: 2021-04-26 | End: 2021-04-30

## 2021-04-26 RX ORDER — SODIUM CHLORIDE 9 MG/ML
1000 INJECTION, SOLUTION INTRAVENOUS
Refills: 0 | Status: DISCONTINUED | OUTPATIENT
Start: 2021-04-26 | End: 2021-04-27

## 2021-04-26 RX ORDER — OXYCODONE HYDROCHLORIDE 5 MG/1
5 TABLET ORAL
Refills: 0 | Status: DISCONTINUED | OUTPATIENT
Start: 2021-04-26 | End: 2021-04-30

## 2021-04-26 RX ORDER — MAGNESIUM HYDROXIDE 400 MG/1
30 TABLET, CHEWABLE ORAL
Refills: 0 | Status: DISCONTINUED | OUTPATIENT
Start: 2021-04-26 | End: 2021-04-30

## 2021-04-26 RX ORDER — METOCLOPRAMIDE HCL 10 MG
10 TABLET ORAL ONCE
Refills: 0 | Status: DISCONTINUED | OUTPATIENT
Start: 2021-04-26 | End: 2021-04-26

## 2021-04-26 RX ORDER — CITRIC ACID/SODIUM CITRATE 300-500 MG
30 SOLUTION, ORAL ORAL ONCE
Refills: 0 | Status: DISCONTINUED | OUTPATIENT
Start: 2021-04-26 | End: 2021-04-26

## 2021-04-26 RX ORDER — SIMETHICONE 80 MG/1
80 TABLET, CHEWABLE ORAL EVERY 4 HOURS
Refills: 0 | Status: DISCONTINUED | OUTPATIENT
Start: 2021-04-26 | End: 2021-04-30

## 2021-04-26 RX ORDER — ACETAMINOPHEN 500 MG
975 TABLET ORAL
Refills: 0 | Status: DISCONTINUED | OUTPATIENT
Start: 2021-04-26 | End: 2021-04-27

## 2021-04-26 RX ORDER — OXYCODONE HYDROCHLORIDE 5 MG/1
5 TABLET ORAL ONCE
Refills: 0 | Status: DISCONTINUED | OUTPATIENT
Start: 2021-04-26 | End: 2021-04-30

## 2021-04-26 RX ORDER — TETANUS TOXOID, REDUCED DIPHTHERIA TOXOID AND ACELLULAR PERTUSSIS VACCINE, ADSORBED 5; 2.5; 8; 8; 2.5 [IU]/.5ML; [IU]/.5ML; UG/.5ML; UG/.5ML; UG/.5ML
0.5 SUSPENSION INTRAMUSCULAR ONCE
Refills: 0 | Status: DISCONTINUED | OUTPATIENT
Start: 2021-04-26 | End: 2021-04-30

## 2021-04-26 RX ORDER — HEPARIN SODIUM 5000 [USP'U]/ML
5000 INJECTION INTRAVENOUS; SUBCUTANEOUS EVERY 12 HOURS
Refills: 0 | Status: DISCONTINUED | OUTPATIENT
Start: 2021-04-27 | End: 2021-04-30

## 2021-04-26 RX ADMIN — SODIUM CHLORIDE 1000 MILLILITER(S): 9 INJECTION, SOLUTION INTRAVENOUS at 21:05

## 2021-04-26 RX ADMIN — Medication 1000 MILLIUNIT(S)/MIN: at 21:34

## 2021-04-26 RX ADMIN — SODIUM CHLORIDE 125 MILLILITER(S): 9 INJECTION, SOLUTION INTRAVENOUS at 16:07

## 2021-04-26 NOTE — OB PROVIDER DELIVERY SUMMARY - NSSELHIDDEN_OBGYN_ALL_OB_FT
[NS_DeliveryAttending1_OBGYN_ALL_OB_FT:[id number]],[NS_DeliveryAssist1_OBGYN_ALL_OB_FT:[id number]=],[NS_DeliveryRN_OBGYN_ALL_OB_FT:[id number]=]

## 2021-04-26 NOTE — OB POSTPARTUM EVENT NOTE - NS_EVENTPTSUMMARY1_OBGYN_ALL_OB_FT
primary c/s for arrest at 40.1 at     /81  HR 78  Pulse Ox 95%    Urine output for  was 225mL  Pt denies complaints of pain. Toradol given at    Fundus firm, midline, at umbilicus    In OR pt received methergine IM, extra 20 units of pit in bag, TXA  primary c/s for arrest at 40.1 at   qBL 1032 mL  /81  HR 78  Pulse Ox 95%    Urine output for 2200 was 225mL  Pt denies complaints of pain. Toradol given at    Fundus firm, midline, at umbilicus    In OR pt received methergine IM, extra 20 units of pit in bag, TXA

## 2021-04-26 NOTE — OB POSTPARTUM EVENT NOTE - NS_EVENTFINDINGS1_OBGYN_ALL_OB_FT
If RN concerned about bleeding, continue to observe patient for 1 hour. If bleeding remains normal, pt approved to transfer to postpartum

## 2021-04-26 NOTE — OB RN DELIVERY SUMMARY - NSMATERNALFETALCONCERNS_OBGYN_ALL_OB_FT
FETAL ALERT  late transfer of care at 38 weeks secondary to CHD - [place] [facility] [place]  alert NICU, RIGHT renal dilation and mild calyceal dilation on ATU sonogram (2021)   4.16. Fetal Alert Addendum - DORV / D-TGA / VSD.  inform pediatric cardiology for an urgent post  echocardiogram immediately after birth in the  intensive care unit to determine the status of the aortic arch for coarctation. Prostaglandin E1 may be kept available at bedside to start only if coarctation is confirmed. This baby will likely have transposition physiology and may need a balloon atrial septostomy based on the saturation after birth to enhance mixing. Jojo Palmer RN,MSN

## 2021-04-26 NOTE — OB PROVIDER LABOR PROGRESS NOTE - NS_OBIHIFHRDETAILS_OBGYN_ALL_OB_FT
Baseline 130s, moderate variability, accelerations present, no decels
130BPM  moderate variability  +accels  -decels
120, mod, +accl, -decel

## 2021-04-26 NOTE — OB RN DELIVERY SUMMARY - NSSELHIDDEN_OBGYN_ALL_OB_FT
[NS_DeliveryAttending1_OBGYN_ALL_OB_FT:[id number]],[NS_DeliveryAssist1_OBGYN_ALL_OB_FT:[id number]],[NS_DeliveryRN_OBGYN_ALL_OB_FT:[email or web address]=]

## 2021-04-26 NOTE — OB PROVIDER DELIVERY SUMMARY - NSPROVIDERDELIVERYNOTE_OBGYN_ALL_OB_FT
pLTCS for failed induction of labor. Viable female infant delivered from vertex presentation. Nuchal cord x1. Apgars 8/8, weight 3445g. Uterine atony improved with closure of uterus, additional pitocin and methergine IM x1. Uterus closed in one layer with Vicryl. Peritoneum reapproximated with Vicryl. Grossly normal uterus tubes and ovaries bilaterally.     EBL 1032cc  IVF 1500cc  UOP 250cc  KBeetham PGY4

## 2021-04-26 NOTE — OB RN INTRAOPERATIVE NOTE - NSSELHIDDEN_OBGYN_ALL_OB_FT
[NS_DeliveryAttending1_OBGYN_ALL_OB_FT:[id number]],[NS_DeliveryAssist1_OBGYN_ALL_OB_FT:[id number]],[NS_DeliveryRN_OBGYN_ALL_OB_FT:[email or web address]=]

## 2021-04-26 NOTE — OB NEONATOLOGY/PEDIATRICIAN DELIVERY SUMMARY - NSPEDSNEONOTESA_OBGYN_ALL_OB_FT
Pediatrician called to delivery for fetal alert -  DORV / D-TGA / VSD. Female infant born at 40.1 wks via  to a 21 y/o  blood type O+ mother. Maternal history of HSV on valtrex.  Prenatal labs nr/immune/-, Covid - both parents. GBS - on . SROM at 12am on  with clear fluids. Baby emerged nuchal x1, vigorous, crying. Infant was brought to radiant warmer and warmed, dried, stimulated and suctioned. HR>100, normal respiratory effort. APGARS of 8 & 8. Mom is initiating breast feeding. Consents to Hepatitis B vaccination. EOS score 0.44. [place] transferred to NICU for escalation fo care. Temp 36.5*C at transfer.

## 2021-04-26 NOTE — OB PROVIDER LABOR PROGRESS NOTE - ASSESSMENT
21 y.o.  at 40w1d presenting for PROM induction on pitocin of 20. Cervical exam unchanged. Patient counselled on  delivery due to exam being unchanged at current dosage of pitocin.     Plan   - Continue to monitor   - Continue pitocin     to be d/w Dr. Yao Demarco PGY-1
IOL PROM  - continue pitocin  - continuous monitoring    d/w Dr. Antwon Salcedo PGY1
Patient is a 22yo  @40w admitted for PROM IOL. For pitocin for augmentation of labor.    Yakelin Robbins, PGY2  D/W Dr. Kapoor

## 2021-04-26 NOTE — OB PROVIDER LABOR PROGRESS NOTE - NS_SUBJECTIVE/OBJECTIVE_OBGYN_ALL_OB_FT
Patient evaluated bedside for labor progression. 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 She has been on valtrex suppression since 3/31. She denies any current or recent lesions or prodromal symptoms.    VS  T(C): 37.4 (04-25-21 @ 22:10)  HR: 93 (04-25-21 @ 23:03)  BP: 123/72 (04-25-21 @ 22:42)  RR: 18 (04-25-21 @ 18:45)  SpO2: 100% (04-25-21 @ 23:08)    SSE: Negative for any lesions
Patient seen bedside for repeat exam. Comfortable with epidural.
Pt examined at bedside for fetal scalp stimulation

## 2021-04-26 NOTE — OB RN DELIVERY SUMMARY - NS_SEPSISRSKCALC_OBGYN_ALL_OB_FT
EOS calculated successfully. EOS Risk Factor: 0.5/1000 live births ([facility] national incidence); GA=40w1d; Temp=99.32; ROM=22.217; GBS='Negative'; Antibiotics='No antibiotics or any antibiotics < 2 hrs prior to birth'

## 2021-04-26 NOTE — OB PROVIDER DELIVERY SUMMARY - NSMATERNALFETALCONCERNS_OBGYN_ALL_OB_FT
FETAL ALERT  late transfer of care at 38 weeks secondary to CHD - SEE FETAL ECHO FROM [facility] [place] (4/15/2021)  alert NICU, RIGHT renal dilation and mild calyceal dilation on ATU sonogram (2021)   4.16. Fetal Alert Addendum - DORV / D-TGA / VSD.  inform pediatric cardiology for an urgent post  echocardiogram immediately after birth in the  intensive care unit to determine the status of the aortic arch for coarctation. Prostaglandin E1 may be kept available at bedside to start only if coarctation is confirmed. This baby will likely have transposition physiology and may need a balloon atrial septostomy based on the saturation after birth to enhance mixing. Jojo Palmer RN,MSN

## 2021-04-26 NOTE — OB NEONATOLOGY/PEDIATRICIAN DELIVERY SUMMARY - NSMATERNALFETALCONCERNS_OBGYN_ALL_OB_FT
FETAL ALERT  late transfer of care at 38 weeks secondary to CHD - SEE FETAL [place] [facility] [place]15/2021)  alert NICU, RIGHT renal dilation and mild calyceal dilation on ATU sonogram (2021)   4.16. Fetal Alert Addendum - DORV / D-TGA / VSD.  inform pediatric cardiology for an urgent post  echocardiogram immediately after birth in the  intensive care unit to determine the status of the aortic arch for coarctation. Prostaglandin E1 may be kept available at bedside to start only if coarctation is confirmed. This baby will likely have transposition physiology and may need a balloon atrial septostomy based on the saturation after birth to enhance mixing. Jojo Palmer RN,MSN

## 2021-04-27 DIAGNOSIS — O42.10 PREMATURE RUPTURE OF MEMBRANES, ONSET OF LABOR MORE THAN 24 HOURS FOLLOWING RUPTURE, UNSPECIFIED WEEKS OF GESTATION: ICD-10-CM

## 2021-04-27 LAB
BASOPHILS # BLD AUTO: 0 K/UL — SIGNIFICANT CHANGE UP (ref 0–0.2)
BASOPHILS NFR BLD AUTO: 0 % — SIGNIFICANT CHANGE UP (ref 0–2)
EOSINOPHIL # BLD AUTO: 0 K/UL — SIGNIFICANT CHANGE UP (ref 0–0.5)
EOSINOPHIL NFR BLD AUTO: 0 % — SIGNIFICANT CHANGE UP (ref 0–6)
HCT VFR BLD CALC: 27.8 % — LOW (ref 34.5–45)
HGB BLD-MCNC: 8.9 G/DL — LOW (ref 11.5–15.5)
IANC: 18.23 K/UL — HIGH (ref 1.5–8.5)
LYMPHOCYTES # BLD AUTO: 0.55 K/UL — LOW (ref 1–3.3)
LYMPHOCYTES # BLD AUTO: 2.6 % — LOW (ref 13–44)
MCHC RBC-ENTMCNC: 24.6 PG — LOW (ref 27–34)
MCHC RBC-ENTMCNC: 32 GM/DL — SIGNIFICANT CHANGE UP (ref 32–36)
MCV RBC AUTO: 76.8 FL — LOW (ref 80–100)
MONOCYTES # BLD AUTO: 0.55 K/UL — SIGNIFICANT CHANGE UP (ref 0–0.9)
MONOCYTES NFR BLD AUTO: 2.6 % — SIGNIFICANT CHANGE UP (ref 2–14)
NEUTROPHILS # BLD AUTO: 19.99 K/UL — HIGH (ref 1.8–7.4)
NEUTROPHILS NFR BLD AUTO: 90.4 % — HIGH (ref 43–77)
PLATELET # BLD AUTO: 252 K/UL — SIGNIFICANT CHANGE UP (ref 150–400)
RBC # BLD: 3.62 M/UL — LOW (ref 3.8–5.2)
RBC # FLD: 14.1 % — SIGNIFICANT CHANGE UP (ref 10.3–14.5)
WBC # BLD: 21.09 K/UL — HIGH (ref 3.8–10.5)
WBC # FLD AUTO: 21.09 K/UL — HIGH (ref 3.8–10.5)

## 2021-04-27 RX ORDER — BENZOYL PEROXIDE MICRONIZED 5.8 %
1 TOWELETTE (EA) TOPICAL
Qty: 0 | Refills: 0 | DISCHARGE

## 2021-04-27 RX ORDER — ASCORBIC ACID 60 MG
500 TABLET,CHEWABLE ORAL DAILY
Refills: 0 | Status: DISCONTINUED | OUTPATIENT
Start: 2021-04-27 | End: 2021-04-30

## 2021-04-27 RX ORDER — ACETAMINOPHEN 500 MG
975 TABLET ORAL EVERY 6 HOURS
Refills: 0 | Status: DISCONTINUED | OUTPATIENT
Start: 2021-04-27 | End: 2021-04-30

## 2021-04-27 RX ORDER — FERROUS SULFATE 325(65) MG
325 TABLET ORAL DAILY
Refills: 0 | Status: DISCONTINUED | OUTPATIENT
Start: 2021-04-27 | End: 2021-04-30

## 2021-04-27 RX ORDER — IBUPROFEN 200 MG
1 TABLET ORAL
Qty: 0 | Refills: 0 | DISCHARGE
Start: 2021-04-27

## 2021-04-27 RX ORDER — ONDANSETRON 8 MG/1
4 TABLET, FILM COATED ORAL ONCE
Refills: 0 | Status: COMPLETED | OUTPATIENT
Start: 2021-04-27 | End: 2021-04-27

## 2021-04-27 RX ADMIN — Medication 500 MILLIGRAM(S): at 12:39

## 2021-04-27 RX ADMIN — MAGNESIUM HYDROXIDE 30 MILLILITER(S): 400 TABLET, CHEWABLE ORAL at 20:09

## 2021-04-27 RX ADMIN — Medication 0.2 MILLIGRAM(S): at 12:39

## 2021-04-27 RX ADMIN — Medication 975 MILLIGRAM(S): at 11:05

## 2021-04-27 RX ADMIN — Medication 325 MILLIGRAM(S): at 12:39

## 2021-04-27 RX ADMIN — HEPARIN SODIUM 5000 UNIT(S): 5000 INJECTION INTRAVENOUS; SUBCUTANEOUS at 14:07

## 2021-04-27 RX ADMIN — Medication 975 MILLIGRAM(S): at 21:18

## 2021-04-27 RX ADMIN — Medication 975 MILLIGRAM(S): at 09:43

## 2021-04-27 RX ADMIN — Medication 1 TABLET(S): at 12:39

## 2021-04-27 RX ADMIN — Medication 0.2 MILLIGRAM(S): at 08:02

## 2021-04-27 RX ADMIN — ONDANSETRON 4 MILLIGRAM(S): 8 TABLET, FILM COATED ORAL at 04:08

## 2021-04-27 RX ADMIN — HEPARIN SODIUM 5000 UNIT(S): 5000 INJECTION INTRAVENOUS; SUBCUTANEOUS at 03:02

## 2021-04-27 RX ADMIN — Medication 975 MILLIGRAM(S): at 20:09

## 2021-04-27 RX ADMIN — Medication 0.2 MILLIGRAM(S): at 04:17

## 2021-04-27 RX ADMIN — Medication 0.2 MILLIGRAM(S): at 00:18

## 2021-04-27 RX ADMIN — SIMETHICONE 80 MILLIGRAM(S): 80 TABLET, CHEWABLE ORAL at 20:14

## 2021-04-27 RX ADMIN — Medication 0.2 MILLIGRAM(S): at 16:07

## 2021-04-27 NOTE — PROGRESS NOTE ADULT - PROBLEM SELECTOR PLAN 1
- Continue with po analgesia  - Increase ambulation  - Continue regular diet  - Post op anemia 2/2 blood loss 2/2 pLTCS on iron and vitamin c   - FRENCH Demarco  PGY-1 - Continue with po analgesia  - Increase ambulation  - Continue regular diet  - Post op anemia 2/2 blood loss 2/2 pLTCS on iron and vitamin c   - DTV, bladder scan and straight cath if unable to     Annalese Dav  PGY-1

## 2021-04-27 NOTE — DISCHARGE NOTE OB - CARE PROVIDER_API CALL
Gloria Batres (MD)  Obstetrics and Gynecology  [facility] [place] [address]  Phone: [phone]  Fax: [phone]  Follow Up Time:

## 2021-04-27 NOTE — DISCHARGE NOTE OB - HOME CARE AGENCY TYPE OF SERVICE:
Blood pressure monitoring. Nurse will visit patient the day after discharge. Nurse will call patient to schedule visit time.

## 2021-04-27 NOTE — DISCHARGE NOTE OB - HOSPITAL COURSE
P0 @ term admitted with PROM, delivered viable female infant via LTCS secondary to arrest of dilation at 4cm. Infant stable to NIcU due to cardiac anomalies. Postpartum course uncomplicated

## 2021-04-27 NOTE — DISCHARGE NOTE OB - PATIENT PORTAL LINK FT
You can access the FollowMyHealth Patient Portal offered by [facility] by registering at the following website: [email or web address]. By joining [employer]’s FollowMyHealth portal, you will also be able to view your health information using other applications (apps) compatible with our system.

## 2021-04-27 NOTE — PROGRESS NOTE ADULT - ASSESSMENT
20y/o  POD#1 from hospitals, EBL 1032 on methergine series (-) s/p TXA, methergine for arrest of dilatation. PMH significant for left eye glaucoma. H/H 8.9/27.8. No current issues.

## 2021-04-27 NOTE — DISCHARGE NOTE OB - CARE PLAN
Principal Discharge DX:	 delivery delivered  Goal:	recovery  Assessment and plan of treatment:	no change

## 2021-04-28 LAB
BASOPHILS # BLD AUTO: 0.03 K/UL — SIGNIFICANT CHANGE UP (ref 0–0.2)
BASOPHILS NFR BLD AUTO: 0.2 % — SIGNIFICANT CHANGE UP (ref 0–2)
EOSINOPHIL # BLD AUTO: 0.04 K/UL — SIGNIFICANT CHANGE UP (ref 0–0.5)
EOSINOPHIL NFR BLD AUTO: 0.2 % — SIGNIFICANT CHANGE UP (ref 0–6)
HCT VFR BLD CALC: 27.8 % — LOW (ref 34.5–45)
HGB BLD-MCNC: 8.6 G/DL — LOW (ref 11.5–15.5)
IANC: 15.97 K/UL — HIGH (ref 1.5–8.5)
IMM GRANULOCYTES NFR BLD AUTO: 0.9 % — SIGNIFICANT CHANGE UP (ref 0–1.5)
LYMPHOCYTES # BLD AUTO: 1.39 K/UL — SIGNIFICANT CHANGE UP (ref 1–3.3)
LYMPHOCYTES # BLD AUTO: 7.2 % — LOW (ref 13–44)
MCHC RBC-ENTMCNC: 24.5 PG — LOW (ref 27–34)
MCHC RBC-ENTMCNC: 30.9 GM/DL — LOW (ref 32–36)
MCV RBC AUTO: 79.2 FL — LOW (ref 80–100)
MONOCYTES # BLD AUTO: 1.79 K/UL — HIGH (ref 0–0.9)
MONOCYTES NFR BLD AUTO: 9.2 % — SIGNIFICANT CHANGE UP (ref 2–14)
NEUTROPHILS # BLD AUTO: 15.97 K/UL — HIGH (ref 1.8–7.4)
NEUTROPHILS NFR BLD AUTO: 82.3 % — HIGH (ref 43–77)
NRBC # BLD: 0 /100 WBCS — SIGNIFICANT CHANGE UP
NRBC # FLD: 0 K/UL — SIGNIFICANT CHANGE UP
PLATELET # BLD AUTO: 296 K/UL — SIGNIFICANT CHANGE UP (ref 150–400)
RBC # BLD: 3.51 M/UL — LOW (ref 3.8–5.2)
RBC # FLD: 14.6 % — HIGH (ref 10.3–14.5)
WBC # BLD: 19.39 K/UL — HIGH (ref 3.8–10.5)
WBC # FLD AUTO: 19.39 K/UL — HIGH (ref 3.8–10.5)

## 2021-04-28 RX ADMIN — HEPARIN SODIUM 5000 UNIT(S): 5000 INJECTION INTRAVENOUS; SUBCUTANEOUS at 17:40

## 2021-04-28 RX ADMIN — Medication 975 MILLIGRAM(S): at 23:43

## 2021-04-28 RX ADMIN — Medication 975 MILLIGRAM(S): at 20:10

## 2021-04-28 RX ADMIN — Medication 975 MILLIGRAM(S): at 04:50

## 2021-04-28 RX ADMIN — HEPARIN SODIUM 5000 UNIT(S): 5000 INJECTION INTRAVENOUS; SUBCUTANEOUS at 06:22

## 2021-04-28 RX ADMIN — Medication 975 MILLIGRAM(S): at 03:50

## 2021-04-28 NOTE — PROGRESS NOTE ADULT - ASSESSMENT
PE:  Lung sounds clear bilaterally. Normal heart rhythm.   Breasts: soft, nontender  Nipples: intact  Abdomen: Soft, nondistended and nontender. Bowel sounds present. Fundus firm. No uterine tenderness noted  Abdominal incision: Clean, dry and intact with dermabond.   Vaginal: Lochia light rubra  Extremities: Slight edema noted bilaterally and equal to lower extremities, nontender with no erythremic areas noted. Positive pedal pulses. No palpable veins noted  Other relevant physical exam findings: none          Plan  A/P: 21y      Day#2    primary post-operative  section delivery for arrest of dilatation QBL 1032cc with uterine atony s/p methergine and extra pitocin. Postpartum asymptomatic anemia. Elevated WBC 21.09 with am rpt 19.39 afebrile asymptomatic. c/o not passing flatus with negative bowel movement.   Stable          Problem#1:  section delivery  Continue routine post-operative and postpartum care  Increase ambulation, analgesia PRN and pain medication protocol of standing ibuprofen and acetaminophen and oxycodone prn  Encouraged to wear abdominal binder for support and to use incentive spirometer  Discussed breast/nipple care and breastfeeding.  Discussed abdominal incision care  Discussed discharge planning.     Problem#2: negative flatus with negative bowel movement  Encouraged increase ambulation and discussed constipation relieving interventions and medications    Problem#3: Postpartum asymptomatic anemia  Discussed iron supplementation, increase hydration, dietary implementation and signs/symptoms to report    Problem#4: Elevated WBC 21.09 with am rpt 19.39   afebrile asymptomatic.  Will continue to monitor.     Problem#5: left eye glaucoma  F/U with opthalmology postpartum.     PE:  Lung sounds clear bilaterally. Normal heart rhythm.   Breasts: soft, nontender  Nipples: intact  Abdomen: Soft, nondistended and nontender. Bowel sounds present. Fundus firm. No uterine tenderness noted  Abdominal incision: Clean, dry and intact with dermabond.   Vaginal: Lochia light rubra  Extremities: Slight edema noted bilaterally and equal to lower extremities, nontender with no erythremic areas noted. Positive pedal pulses. No palpable veins noted  Other relevant physical exam findings: none          Plan  A/P: 21y      Day#2    primary post-operative  section delivery for arrest of dilatation QBL 1032cc with uterine atony s/p methergine and extra pitocin. Postpartum asymptomatic anemia. Elevated WBC 21.09 with am rpt 19.39 afebrile asymptomatic. c/o not passing flatus with negative bowel movement. Labile BPs on L&D BPs 100s-130s/70s-80s  Stable          Problem#1:  section delivery  Continue routine post-operative and postpartum care  Increase ambulation, analgesia PRN and pain medication protocol of standing ibuprofen and acetaminophen and oxycodone prn  Encouraged to wear abdominal binder for support and to use incentive spirometer  Discussed breast/nipple care and breastfeeding.  Discussed abdominal incision care  Discussed discharge planning.     Problem#2: negative flatus with negative bowel movement  Encouraged increase ambulation and discussed constipation relieving interventions and medications    Problem#3: Postpartum asymptomatic anemia  Discussed iron supplementation, increase hydration, dietary implementation and signs/symptoms to report    Problem#4: Elevated WBC 21.09 with am rpt 19.39   afebrile asymptomatic.  Will continue to monitor.     Problem#5: left eye glaucoma  F/U with opthalmology postpartum.      Problem#6:  Labile BPs on L&D BPs 100s-130s/70s-80s   Discussed signs/symptoms to report and home blood pressure monitoring. Prescription for blood pressure cuff sent to pharmacy.

## 2021-04-29 LAB
ALBUMIN SERPL ELPH-MCNC: 3.1 G/DL — LOW (ref 3.3–5)
ALP SERPL-CCNC: 122 U/L — HIGH (ref 40–120)
ALT FLD-CCNC: 18 U/L — SIGNIFICANT CHANGE UP (ref 4–33)
ANION GAP SERPL CALC-SCNC: 11 MMOL/L — SIGNIFICANT CHANGE UP (ref 7–14)
APTT BLD: 28.8 SEC — SIGNIFICANT CHANGE UP (ref 27–36.3)
AST SERPL-CCNC: 27 U/L — SIGNIFICANT CHANGE UP (ref 4–32)
BASOPHILS # BLD AUTO: 0.03 K/UL — SIGNIFICANT CHANGE UP (ref 0–0.2)
BASOPHILS NFR BLD AUTO: 0.2 % — SIGNIFICANT CHANGE UP (ref 0–2)
BILIRUB SERPL-MCNC: 0.2 MG/DL — SIGNIFICANT CHANGE UP (ref 0.2–1.2)
BUN SERPL-MCNC: 5 MG/DL — LOW (ref 7–23)
CALCIUM SERPL-MCNC: 8.8 MG/DL — SIGNIFICANT CHANGE UP (ref 8.4–10.5)
CHLORIDE SERPL-SCNC: 104 MMOL/L — SIGNIFICANT CHANGE UP (ref 98–107)
CO2 SERPL-SCNC: 25 MMOL/L — SIGNIFICANT CHANGE UP (ref 22–31)
CREAT SERPL-MCNC: 0.66 MG/DL — SIGNIFICANT CHANGE UP (ref 0.5–1.3)
EOSINOPHIL # BLD AUTO: 0.17 K/UL — SIGNIFICANT CHANGE UP (ref 0–0.5)
EOSINOPHIL NFR BLD AUTO: 1.3 % — SIGNIFICANT CHANGE UP (ref 0–6)
FIBRINOGEN PPP-MCNC: 825 MG/DL — HIGH (ref 290–520)
GLUCOSE SERPL-MCNC: 72 MG/DL — SIGNIFICANT CHANGE UP (ref 70–99)
HCT VFR BLD CALC: 24.4 % — LOW (ref 34.5–45)
HGB BLD-MCNC: 7.6 G/DL — LOW (ref 11.5–15.5)
IANC: 10.27 K/UL — HIGH (ref 1.5–8.5)
IMM GRANULOCYTES NFR BLD AUTO: 1.3 % — SIGNIFICANT CHANGE UP (ref 0–1.5)
INR BLD: 1.03 RATIO — SIGNIFICANT CHANGE UP (ref 0.88–1.16)
LDH SERPL L TO P-CCNC: 342 U/L — HIGH (ref 135–225)
LYMPHOCYTES # BLD AUTO: 1.74 K/UL — SIGNIFICANT CHANGE UP (ref 1–3.3)
LYMPHOCYTES # BLD AUTO: 13 % — SIGNIFICANT CHANGE UP (ref 13–44)
MCHC RBC-ENTMCNC: 24.3 PG — LOW (ref 27–34)
MCHC RBC-ENTMCNC: 31.1 GM/DL — LOW (ref 32–36)
MCV RBC AUTO: 78 FL — LOW (ref 80–100)
MONOCYTES # BLD AUTO: 1.03 K/UL — HIGH (ref 0–0.9)
MONOCYTES NFR BLD AUTO: 7.7 % — SIGNIFICANT CHANGE UP (ref 2–14)
NEUTROPHILS # BLD AUTO: 10.27 K/UL — HIGH (ref 1.8–7.4)
NEUTROPHILS NFR BLD AUTO: 76.5 % — SIGNIFICANT CHANGE UP (ref 43–77)
NRBC # BLD: 0 /100 WBCS — SIGNIFICANT CHANGE UP
NRBC # FLD: 0.08 K/UL — HIGH
PLATELET # BLD AUTO: 329 K/UL — SIGNIFICANT CHANGE UP (ref 150–400)
POTASSIUM SERPL-MCNC: 3.7 MMOL/L — SIGNIFICANT CHANGE UP (ref 3.5–5.3)
POTASSIUM SERPL-SCNC: 3.7 MMOL/L — SIGNIFICANT CHANGE UP (ref 3.5–5.3)
PROT SERPL-MCNC: 5.9 G/DL — LOW (ref 6–8.3)
PROTHROM AB SERPL-ACNC: 11.7 SEC — SIGNIFICANT CHANGE UP (ref 10.6–13.6)
RBC # BLD: 3.13 M/UL — LOW (ref 3.8–5.2)
RBC # FLD: 14.9 % — HIGH (ref 10.3–14.5)
SODIUM SERPL-SCNC: 140 MMOL/L — SIGNIFICANT CHANGE UP (ref 135–145)
URATE SERPL-MCNC: 6.4 MG/DL — SIGNIFICANT CHANGE UP (ref 2.5–7)
WBC # BLD: 13.41 K/UL — HIGH (ref 3.8–10.5)
WBC # FLD AUTO: 13.41 K/UL — HIGH (ref 3.8–10.5)

## 2021-04-29 RX ORDER — IBUPROFEN 200 MG
600 TABLET ORAL EVERY 6 HOURS
Refills: 0 | Status: DISCONTINUED | OUTPATIENT
Start: 2021-04-29 | End: 2021-04-30

## 2021-04-29 RX ADMIN — Medication 975 MILLIGRAM(S): at 06:18

## 2021-04-29 RX ADMIN — Medication 975 MILLIGRAM(S): at 07:15

## 2021-04-29 RX ADMIN — Medication 600 MILLIGRAM(S): at 12:30

## 2021-04-29 RX ADMIN — HEPARIN SODIUM 5000 UNIT(S): 5000 INJECTION INTRAVENOUS; SUBCUTANEOUS at 06:17

## 2021-04-29 RX ADMIN — Medication 600 MILLIGRAM(S): at 11:41

## 2021-04-29 RX ADMIN — HEPARIN SODIUM 5000 UNIT(S): 5000 INJECTION INTRAVENOUS; SUBCUTANEOUS at 17:43

## 2021-04-29 NOTE — CHART NOTE - NSCHARTNOTEFT_GEN_A_CORE
OB Attg (Late entry from 515p)    Patient examined due to pelvic pressure.   VE: 4/100/-3  Cat 1 FHT, contractions q2 min   Will give additional pain medication and reposition.   If exam unchanged will proceed with primary C/s  Angeles Lombardo MD
Ob Attg  Pt comfortable, feels some back pain  , pos accels, moderate variability, no decels  toco q2-4min  VE 4/80/-3, forebag--AROM, clear  PROM at term, fetal alert  IUPC placed  continue Pitocin
PGY1 Tracing Note    130s baseline ,mod petey, -accels, -decels  [place] q1-3min    IOL for PROM, currently on pitocin. Fetal status reassuring at this time.    Berenice Luna PGY1
R1 Chart Note     Patient seen bedside to discuss meeting criteria for gHTN. Patient denies any chest pain, SOB, headache, vision changes, RUQ pain. Counselled patient on continuing to check BPs following discharge. Also discussed with patient that if she develops severe range BPs or has lab abnormalities she may fit criteria for severe preeclampsia and need to be started on Magnesium for seizure prophylaxis. Patient expressed understanding, answered all questions.     Vital Signs Last 24 Hrs  T(C): 36.7 (2021 14:25), Max: 37.6 (2021 17:48)  T(F): 98.1 (2021 14:25), Max: 99.6 (2021 17:48)  HR: 90 (2021 14:25) (90 - 112)  BP: 129/82 (2021 14:25) (115/79 - 131/94)  BP(mean): --  RR: 18 (2021 14:25) (17 - 18)  SpO2: 100% (2021 14:25) (98% - 100%)    A/P    21 y.o.  POD#3 from pLTCS due to arrest. Patient now meets criteria for gHTN. HELLP labs pending for today, HELLP ()wnl, P/C: 0.2.     - Continue to monitor BP     Laney Demarco PGY-1
R1 Tracing note    Baseline 125, mod, -accel, -decel  Category 1 tracing  [place] q6 min    IOL PROM  - Pitocin since 10p  - continuous monitoring  - anticipate   - Fetal alert - NICU aware    Olive Salcedo PGY1
Att Note:   In to evaluate pt. Pt s/p SROM 19 hrs ago. Cervix 3 cm at most posterior/60%/-3 station. FHR category I. Will start po cytotec. Pt agrees. Will likely be moving to a different birthing room to be closer to stabilization area for fetus with cardiac anomalies. Gloria Kapoor MD
OB Attg  Patient examined due to continued pressure  VE unchanged. Discussed with patient arrest of dilation and failed induction,   thus indication for  delivery. Discussed risks including bleeding, infection and damage to surrounding organs  Consent signed, nursing and anesthesia aware

## 2021-04-30 VITALS
OXYGEN SATURATION: 99 % | DIASTOLIC BLOOD PRESSURE: 75 MMHG | TEMPERATURE: 99 F | SYSTOLIC BLOOD PRESSURE: 120 MMHG | RESPIRATION RATE: 18 BRPM | HEART RATE: 90 BPM

## 2021-04-30 RX ADMIN — Medication 600 MILLIGRAM(S): at 07:15

## 2021-04-30 RX ADMIN — Medication 600 MILLIGRAM(S): at 12:45

## 2021-04-30 RX ADMIN — HEPARIN SODIUM 5000 UNIT(S): 5000 INJECTION INTRAVENOUS; SUBCUTANEOUS at 06:14

## 2021-04-30 RX ADMIN — Medication 600 MILLIGRAM(S): at 11:48

## 2021-04-30 RX ADMIN — Medication 600 MILLIGRAM(S): at 06:14

## 2021-04-30 NOTE — PROGRESS NOTE ADULT - PROBLEM SELECTOR PLAN 1
- Continue with po analgesia  - Increase ambulation  - Continue regular diet    Bing Phipps, PGY-1  PGY-1

## 2021-04-30 NOTE — PROGRESS NOTE ADULT - ASSESSMENT
22y/o  POD#4 from pLTCS, EBL 1032 s/p methergine series (-) s/p TXA, methergine for arrest of dilatation. Pt meeting criteria for gHTN during this admission. PMH significant for left eye glaucoma. H/H 8.9/27.8. No current issues.

## 2021-04-30 NOTE — PROGRESS NOTE ADULT - ATTENDING COMMENTS
Pt seen and examined by me today. I agree with findings, assessment and plan documented in NP note. Pt to stay until pod #4.  for cardiac surgery May 3.
patient seen and examined at bedside. agree with above assessment and plan  POD#1, doing well
Agree with resident note
saw and examined patient and agree with above
Detail Level: Detailed

## 2021-05-02 ENCOUNTER — INPATIENT (INPATIENT)
Facility: HOSPITAL | Age: 21
LOS: 0 days | Discharge: HOME | End: 2021-05-02
Attending: OBSTETRICS & GYNECOLOGY | Admitting: OBSTETRICS & GYNECOLOGY

## 2021-05-02 DIAGNOSIS — O16.9 UNSPECIFIED MATERNAL HYPERTENSION, UNSPECIFIED TRIMESTER: ICD-10-CM

## 2021-05-02 DIAGNOSIS — Z98.890 OTHER SPECIFIED POSTPROCEDURAL STATES: Chronic | ICD-10-CM

## 2021-05-02 LAB
ALBUMIN SERPL ELPH-MCNC: 3.5 G/DL — SIGNIFICANT CHANGE UP (ref 3.3–5)
ALP SERPL-CCNC: 115 U/L — SIGNIFICANT CHANGE UP (ref 40–120)
ALT FLD-CCNC: 15 U/L — SIGNIFICANT CHANGE UP (ref 4–33)
ANION GAP SERPL CALC-SCNC: 13 MMOL/L — SIGNIFICANT CHANGE UP (ref 7–14)
APPEARANCE UR: CLEAR — SIGNIFICANT CHANGE UP
APTT BLD: 30.6 SEC — SIGNIFICANT CHANGE UP (ref 27–36.3)
AST SERPL-CCNC: 22 U/L — SIGNIFICANT CHANGE UP (ref 4–32)
BACTERIA # UR AUTO: ABNORMAL
BASOPHILS # BLD AUTO: 0.02 K/UL — SIGNIFICANT CHANGE UP (ref 0–0.2)
BASOPHILS NFR BLD AUTO: 0.2 % — SIGNIFICANT CHANGE UP (ref 0–2)
BILIRUB SERPL-MCNC: 0.3 MG/DL — SIGNIFICANT CHANGE UP (ref 0.2–1.2)
BILIRUB UR-MCNC: NEGATIVE — SIGNIFICANT CHANGE UP
BUN SERPL-MCNC: 4 MG/DL — LOW (ref 7–23)
CALCIUM SERPL-MCNC: 8.5 MG/DL — SIGNIFICANT CHANGE UP (ref 8.4–10.5)
CHLORIDE SERPL-SCNC: 105 MMOL/L — SIGNIFICANT CHANGE UP (ref 98–107)
CO2 SERPL-SCNC: 23 MMOL/L — SIGNIFICANT CHANGE UP (ref 22–31)
COLOR SPEC: SIGNIFICANT CHANGE UP
CREAT ?TM UR-MCNC: 211 MG/DL — SIGNIFICANT CHANGE UP
CREAT SERPL-MCNC: 0.67 MG/DL — SIGNIFICANT CHANGE UP (ref 0.5–1.3)
DIFF PNL FLD: ABNORMAL
EOSINOPHIL # BLD AUTO: 0.17 K/UL — SIGNIFICANT CHANGE UP (ref 0–0.5)
EOSINOPHIL NFR BLD AUTO: 1.8 % — SIGNIFICANT CHANGE UP (ref 0–6)
EPI CELLS # UR: 5 /HPF — SIGNIFICANT CHANGE UP (ref 0–5)
FIBRINOGEN PPP-MCNC: 591 MG/DL — HIGH (ref 290–520)
GLUCOSE SERPL-MCNC: 82 MG/DL — SIGNIFICANT CHANGE UP (ref 70–99)
GLUCOSE UR QL: NEGATIVE — SIGNIFICANT CHANGE UP
HCT VFR BLD CALC: 25 % — LOW (ref 34.5–45)
HGB BLD-MCNC: 7.9 G/DL — LOW (ref 11.5–15.5)
IANC: 6.66 K/UL — SIGNIFICANT CHANGE UP (ref 1.5–8.5)
IMM GRANULOCYTES NFR BLD AUTO: 1.7 % — HIGH (ref 0–1.5)
INR BLD: 1.04 RATIO — SIGNIFICANT CHANGE UP (ref 0.88–1.16)
KETONES UR-MCNC: ABNORMAL
LDH SERPL L TO P-CCNC: 333 U/L — HIGH (ref 135–225)
LEUKOCYTE ESTERASE UR-ACNC: NEGATIVE — SIGNIFICANT CHANGE UP
LYMPHOCYTES # BLD AUTO: 1.83 K/UL — SIGNIFICANT CHANGE UP (ref 1–3.3)
LYMPHOCYTES # BLD AUTO: 18.9 % — SIGNIFICANT CHANGE UP (ref 13–44)
MCHC RBC-ENTMCNC: 24.5 PG — LOW (ref 27–34)
MCHC RBC-ENTMCNC: 31.6 GM/DL — LOW (ref 32–36)
MCV RBC AUTO: 77.4 FL — LOW (ref 80–100)
MONOCYTES # BLD AUTO: 0.84 K/UL — SIGNIFICANT CHANGE UP (ref 0–0.9)
MONOCYTES NFR BLD AUTO: 8.7 % — SIGNIFICANT CHANGE UP (ref 2–14)
NEUTROPHILS # BLD AUTO: 6.66 K/UL — SIGNIFICANT CHANGE UP (ref 1.8–7.4)
NEUTROPHILS NFR BLD AUTO: 68.7 % — SIGNIFICANT CHANGE UP (ref 43–77)
NITRITE UR-MCNC: NEGATIVE — SIGNIFICANT CHANGE UP
NRBC # BLD: 0 /100 WBCS — SIGNIFICANT CHANGE UP
NRBC # FLD: 0.05 K/UL — HIGH
PH UR: 6.5 — SIGNIFICANT CHANGE UP (ref 5–8)
PLATELET # BLD AUTO: 432 K/UL — HIGH (ref 150–400)
POTASSIUM SERPL-MCNC: 3.5 MMOL/L — SIGNIFICANT CHANGE UP (ref 3.5–5.3)
POTASSIUM SERPL-SCNC: 3.5 MMOL/L — SIGNIFICANT CHANGE UP (ref 3.5–5.3)
PROT ?TM UR-MCNC: 20 MG/DL — SIGNIFICANT CHANGE UP
PROT ?TM UR-MCNC: 20 MG/DL — SIGNIFICANT CHANGE UP
PROT SERPL-MCNC: 6.5 G/DL — SIGNIFICANT CHANGE UP (ref 6–8.3)
PROT UR-MCNC: ABNORMAL
PROT/CREAT UR-RTO: 0.1 RATIO — SIGNIFICANT CHANGE UP (ref 0–0.2)
PROTHROM AB SERPL-ACNC: 11.8 SEC — SIGNIFICANT CHANGE UP (ref 10.6–13.6)
RBC # BLD: 3.23 M/UL — LOW (ref 3.8–5.2)
RBC # FLD: 15.3 % — HIGH (ref 10.3–14.5)
RBC CASTS # UR COMP ASSIST: 4 /HPF — SIGNIFICANT CHANGE UP (ref 0–4)
SODIUM SERPL-SCNC: 141 MMOL/L — SIGNIFICANT CHANGE UP (ref 135–145)
SP GR SPEC: 1.02 — SIGNIFICANT CHANGE UP (ref 1.01–1.02)
URATE SERPL-MCNC: 7.2 MG/DL — HIGH (ref 2.5–7)
UROBILINOGEN FLD QL: SIGNIFICANT CHANGE UP
WBC # BLD: 9.68 K/UL — SIGNIFICANT CHANGE UP (ref 3.8–10.5)
WBC # FLD AUTO: 9.68 K/UL — SIGNIFICANT CHANGE UP (ref 3.8–10.5)
WBC UR QL: 4 /HPF — SIGNIFICANT CHANGE UP (ref 0–5)

## 2021-05-02 RX ORDER — LABETALOL HCL 100 MG
1 TABLET ORAL
Qty: 60 | Refills: 0
Start: 2021-05-02 | End: 2021-05-31

## 2021-05-02 RX ORDER — NIFEDIPINE 30 MG
1 TABLET, EXTENDED RELEASE 24 HR ORAL
Qty: 30 | Refills: 0
Start: 2021-05-02 | End: 2021-05-31

## 2021-05-02 RX ORDER — NIFEDIPINE 30 MG
30 TABLET, EXTENDED RELEASE 24 HR ORAL ONCE
Refills: 0 | Status: DISCONTINUED | OUTPATIENT
Start: 2021-05-02 | End: 2021-05-02

## 2021-05-02 RX ORDER — LABETALOL HCL 100 MG
200 TABLET ORAL ONCE
Refills: 0 | Status: COMPLETED | OUTPATIENT
Start: 2021-05-02 | End: 2021-05-02

## 2021-05-02 RX ADMIN — Medication 200 MILLIGRAM(S): at 19:25

## 2021-05-02 NOTE — CHART NOTE - NSCHARTNOTEFT_GEN_A_CORE
1720:   22yo  s/p primary  on  presents with c/o headache this morning and /92 at home. Patient took Motrin at 0900 and headache went from 6/10 to 2/10. At this time patient reports HA has completely resolved. Denies N/V, blurry vision, epigastric pain.   COVID-19 negative upon admission to hospital for delivery.     H/O 2019 ETOP x 1    2021- Primary - failed IOL for GHTN. Baby in NICU for cardiac issues.     Assessment reveals VSS, abdomen soft, NT.  BP's as follows: 151/99, 149/96, 138/86, 134/85 pulse 85-99  +3 pitting edema to mid shin bilaterally. +pedal pulses noted bilat  Lungs CTA bilaterally.     HELLP labs sent. 1720:   22yo  s/p primary  on  presents with c/o headache this morning and /92 at home. Patient took Motrin at 0900 and headache went from 6/10 to 2/10. At this time patient reports HA has completely resolved. Denies N/V, blurry vision, epigastric pain.   COVID-19 negative upon admission to hospital for delivery.     H/O 2019 ETOP x 1    2021- Primary - failed IOL for GHTN. Baby in NICU for cardiac issues.     Assessment reveals VSS, abdomen soft, NT.  BP's as follows: 151/99, 149/96, 138/86, 134/85, 138/90, 144/95, 140/92, 141/94, 146/98 pulse 85-99  +3 pitting edema to mid shin bilaterally. +pedal pulses noted bilat  Lungs CTA bilaterally.     HELLP labs sent.    HELLP labs WNL  PCR 0.1    Plan D/W Dr. Samayoa, no evidence of PEC at this time.   Patient dc'd with Rx for Procardia 30mg XL Qd  Follow up in office wednesday May 5th for BP check  Continue checking BP at home and call MD for BP greater than 140/90. 1720:   22yo  s/p primary  on  presents with c/o headache this morning and /92 at home. Patient took Motrin at 0900 and headache went from 6/10 to 2/10. At this time patient reports HA has completely resolved. Denies N/V, blurry vision, epigastric pain.   COVID-19 negative upon admission to hospital for delivery.     H/O 2019 ETOP x 1    2021- Primary - failed IOL for GHTN. Baby in NICU for cardiac issues.     Assessment reveals VSS, abdomen soft, NT.  BP's as follows: 151/99, 149/96, 138/86, 134/85, 138/90, 144/95, 140/92, 141/94, 146/98 pulse 85-99  +3 pitting edema to mid shin bilaterally. +pedal pulses noted bilat  Lungs CTA bilaterally.     HELLP labs sent.    HELLP labs WNL  PCR 0.1    Patient to be written for Procardia XL but cannot swallow pills. D/W Pharmacy, labetalol may be crushed. Dr. Samayoa made aware.     Plan D/W Dr. Samayoa, no evidence of PEC at this time.   Patient dc'd with Rx for Labetalol 200mg BID  Follow up in office wednesday May 5th for BP check  Continue checking BP at home and call MD for BP greater than 140/90.

## 2021-05-03 PROBLEM — H40.002: Chronic | Status: ACTIVE | Noted: 2021-04-25

## 2021-05-03 PROBLEM — B00.9 HERPESVIRAL INFECTION, UNSPECIFIED: Chronic | Status: ACTIVE | Noted: 2021-04-25

## 2021-05-05 ENCOUNTER — NON-APPOINTMENT (OUTPATIENT)
Age: 21
End: 2021-05-05

## 2021-05-05 ENCOUNTER — APPOINTMENT (OUTPATIENT)
Dept: OBGYN | Facility: CLINIC | Age: 21
End: 2021-05-05
Payer: COMMERCIAL

## 2021-05-05 VITALS — SYSTOLIC BLOOD PRESSURE: 152 MMHG | DIASTOLIC BLOOD PRESSURE: 99 MMHG

## 2021-05-05 VITALS — SYSTOLIC BLOOD PRESSURE: 153 MMHG | DIASTOLIC BLOOD PRESSURE: 96 MMHG | HEART RATE: 87 BPM

## 2021-05-05 VITALS — SYSTOLIC BLOOD PRESSURE: 139 MMHG | DIASTOLIC BLOOD PRESSURE: 92 MMHG | HEART RATE: 79 BPM

## 2021-05-05 PROCEDURE — 99211 OFF/OP EST MAY X REQ PHY/QHP: CPT

## 2021-05-05 RX ORDER — LABETALOL HYDROCHLORIDE 200 MG/1
200 TABLET, FILM COATED ORAL 3 TIMES DAILY
Qty: 90 | Refills: 0 | Status: COMPLETED | COMMUNITY
Start: 2021-05-05

## 2021-05-19 ENCOUNTER — NON-APPOINTMENT (OUTPATIENT)
Age: 21
End: 2021-05-19

## 2021-06-07 ENCOUNTER — NON-APPOINTMENT (OUTPATIENT)
Age: 21
End: 2021-06-07

## 2021-06-12 NOTE — DISCHARGE NOTE OB - DO YOU HAVE DIFFICULTY CLIMBING STAIRS
Refill Approved    Rx renewed per Medication Renewal Policy. Medication was last renewed on 8/17/20.    Jennifer James, [facility] Triage/Med Refill 11/2/2020     Requested Prescriptions   Pending Prescriptions Disp Refills     omeprazole (PRILOSEC) 20 MG capsule [Pharmacy Med Name: OMEPRAZOLE DR 20 MG CAPSULE] 90 capsule 0     Sig: PLEASE SEE ATTACHED FOR DETAILED DIRECTIONS       GI Medications Refill Protocol Passed - 10/28/2020  3:38 PM        Passed - PCP or prescribing provider visit in last 12 or next 3 months.     Last office visit with prescriber/PCP: 9/1/2020 Binta Wheeler MD OR same dept: 9/1/2020 Binta Wheeler MD OR same specialty: 9/1/2020 Binta Wheeler MD  Last physical: Visit date not found Last MTM visit: Visit date not found   Next visit within 3 mo: Visit date not found  Next physical within 3 mo: Visit date not found  Prescriber OR PCP: Binta Wheeler MD  Last diagnosis associated with med order: 1. Esophagitis  - omeprazole (PRILOSEC) 20 MG capsule [Pharmacy Med Name: OMEPRAZOLE DR 20 MG CAPSULE]; PLEASE SEE ATTACHED FOR DETAILED DIRECTIONS  Dispense: 90 capsule; Refill: 0    If protocol passes may refill for 12 months if within 3 months of last provider visit (or a total of 15 months).                            
No

## 2021-06-14 ENCOUNTER — APPOINTMENT (OUTPATIENT)
Dept: OBGYN | Facility: CLINIC | Age: 21
End: 2021-06-14
Payer: COMMERCIAL

## 2021-06-14 VITALS
SYSTOLIC BLOOD PRESSURE: 135 MMHG | BODY MASS INDEX: 33.66 KG/M2 | HEART RATE: 83 BPM | WEIGHT: 190 LBS | HEIGHT: 63 IN | DIASTOLIC BLOOD PRESSURE: 89 MMHG

## 2021-06-14 DIAGNOSIS — O35.8XX0 MATERNAL CARE FOR OTHER (SUSPECTED) FETAL ABNORMALITY AND DAMAGE, NOT APPLICABLE OR UNSPECIFIED: ICD-10-CM

## 2021-06-14 PROCEDURE — 0503F POSTPARTUM CARE VISIT: CPT

## 2021-06-14 RX ORDER — LABETALOL HYDROCHLORIDE 200 MG/1
200 TABLET, FILM COATED ORAL
Refills: 0 | Status: ACTIVE | COMMUNITY

## 2021-06-14 RX ORDER — VALACYCLOVIR 500 MG/1
500 TABLET, FILM COATED ORAL
Qty: 60 | Refills: 0 | Status: DISCONTINUED | COMMUNITY
Start: 2021-03-31 | End: 2021-06-14

## 2021-06-14 RX ORDER — FUROSEMIDE 10 MG/ML
10 SOLUTION ORAL
Qty: 60 | Refills: 0 | Status: DISCONTINUED | COMMUNITY
Start: 2021-05-07

## 2021-06-14 RX ORDER — CHLORHEXIDINE GLUCONATE 4 %
325 (65 FE) LIQUID (ML) TOPICAL
Qty: 30 | Refills: 0 | Status: DISCONTINUED | COMMUNITY
Start: 2021-03-03 | End: 2021-06-14

## 2021-06-14 NOTE — HISTORY OF PRESENT ILLNESS
[Postpartum Follow Up] : postpartum follow up [Delivery Date: ___] : on [unfilled] [Primary C/S] : delivered by  section [Female] : Delivery History: baby girl [Rubella Vaccine] : Rubella vaccine administered [Pertussis Vaccine] : Pertussis vaccine administered [Girl] : baby is a girl [___ Lbs] : [unfilled] lbs [___ Oz] : [unfilled] oz [Living at Home] : is currently living at home [Breastfeeding] : currently nursing [None] : No associated symptoms are reported [Clean/Dry/Intact] : clean, dry and intact [Awake] : awake [Alert] : alert [Soft] : soft [Oriented x3] : oriented to person, place, and time [No Lesions] : no genitalia lesions [Normal] : external genitalia [Doing Well] : is doing well [No Sign of Infection] : is showing no signs of infection [Excellent Pain Control] : has excellent pain control [Complications:___] : no complications [Rhogam] : Rhogam was not administered [BTL] : no tubal ligation [Resumed Menses] : has not resumed her menses [Resumed [place]] : has not resumed intercourse [Intended Contraception] : the patient does not intended to use contraception postpartum [Erythema] : not erythematous [Swelling] : not swollen [Dehiscence] : not dehisced [Acute Distress] : no acute distress [Mass] : no breast mass [Nipple Discharge] : no nipple discharge [Axillary LAD] : no axillary lymphadenopathy [Tender] : non tender [Distended] : not distended [Depressed Mood] : not depressed [Flat Affect] : affect not flat [Motion Tenderness] : there was no cervical motion tenderness [Tenderness] : nontender [de-identified] : Nl PPV; HTN [FreeTextEntry8] : This 20 yo  presents post primary C/S for arrest of dilation, for PPV; feels well, infant has had one cardiac surgery with future surgery planned; voiding and stooling without issue, will use condoms for now. Taking BP medication as prescribed for hypertension. [de-identified] : Pt has f/u appointment with PCP for BP assessment in one week; pt will be returning to her old GYN practice, will request records be sent to this office

## 2023-01-30 ENCOUNTER — NON-APPOINTMENT (OUTPATIENT)
Age: 23
End: 2023-01-30

## 2023-04-04 ENCOUNTER — NON-APPOINTMENT (OUTPATIENT)
Age: 23
End: 2023-04-04

## 2023-04-04 ENCOUNTER — APPOINTMENT (OUTPATIENT)
Dept: OPHTHALMOLOGY | Facility: CLINIC | Age: 23
End: 2023-04-04
Payer: COMMERCIAL

## 2023-04-04 PROCEDURE — 99204 OFFICE O/P NEW MOD 45 MIN: CPT

## 2023-04-04 PROCEDURE — 92060 SENSORIMOTOR EXAMINATION: CPT

## 2023-07-26 NOTE — OB PROVIDER TRIAGE NOTE - NS_FETALPRESENTATIONA_OBGYN_ALL_OB
Cephalic Triggering events leading to humiliation, shame, and/or despair (e.g., Loss of relationship, financial or health status) (real or anticipated)

## 2023-08-29 ENCOUNTER — APPOINTMENT (OUTPATIENT)
Dept: OPHTHALMOLOGY | Facility: CLINIC | Age: 23
End: 2023-08-29
Payer: COMMERCIAL

## 2023-08-29 ENCOUNTER — NON-APPOINTMENT (OUTPATIENT)
Age: 23
End: 2023-08-29

## 2023-08-29 PROCEDURE — 92060 SENSORIMOTOR EXAMINATION: CPT

## 2023-08-29 PROCEDURE — 99214 OFFICE O/P EST MOD 30 MIN: CPT

## 2023-10-02 ENCOUNTER — APPOINTMENT (OUTPATIENT)
Dept: OPHTHALMOLOGY | Facility: CLINIC | Age: 23
End: 2023-10-02

## 2023-10-13 ENCOUNTER — APPOINTMENT (OUTPATIENT)
Dept: OPHTHALMOLOGY | Facility: CLINIC | Age: 23
End: 2023-10-13

## 2023-11-03 ENCOUNTER — APPOINTMENT (OUTPATIENT)
Dept: OPHTHALMOLOGY | Facility: CLINIC | Age: 23
End: 2023-11-03
Payer: COMMERCIAL

## 2023-11-03 ENCOUNTER — NON-APPOINTMENT (OUTPATIENT)
Age: 23
End: 2023-11-03

## 2023-11-03 PROCEDURE — 92060 SENSORIMOTOR EXAMINATION: CPT

## 2023-11-03 PROCEDURE — 99214 OFFICE O/P EST MOD 30 MIN: CPT

## 2023-11-08 ENCOUNTER — TRANSCRIPTION ENCOUNTER (OUTPATIENT)
Age: 23
End: 2023-11-08

## 2023-11-09 ENCOUNTER — TRANSCRIPTION ENCOUNTER (OUTPATIENT)
Age: 23
End: 2023-11-09

## 2023-11-09 ENCOUNTER — APPOINTMENT (OUTPATIENT)
Dept: OPHTHALMOLOGY | Facility: HOSPITAL | Age: 23
End: 2023-11-09

## 2023-11-09 ENCOUNTER — NON-APPOINTMENT (OUTPATIENT)
Age: 23
End: 2023-11-09

## 2023-11-09 ENCOUNTER — OUTPATIENT (OUTPATIENT)
Dept: OUTPATIENT SERVICES | Facility: HOSPITAL | Age: 23
LOS: 1 days | End: 2023-11-09
Payer: COMMERCIAL

## 2023-11-09 VITALS
SYSTOLIC BLOOD PRESSURE: 113 MMHG | RESPIRATION RATE: 18 BRPM | DIASTOLIC BLOOD PRESSURE: 66 MMHG | OXYGEN SATURATION: 100 % | HEART RATE: 88 BPM

## 2023-11-09 VITALS
RESPIRATION RATE: 21 BRPM | HEART RATE: 89 BPM | OXYGEN SATURATION: 100 % | DIASTOLIC BLOOD PRESSURE: 80 MMHG | TEMPERATURE: 98 F | WEIGHT: 197.53 LBS | HEIGHT: 62.75 IN | SYSTOLIC BLOOD PRESSURE: 124 MMHG

## 2023-11-09 DIAGNOSIS — Z98.890 OTHER SPECIFIED POSTPROCEDURAL STATES: Chronic | ICD-10-CM

## 2023-11-09 DIAGNOSIS — H50.00 UNSPECIFIED ESOTROPIA: ICD-10-CM

## 2023-11-09 DIAGNOSIS — Z98.891 HISTORY OF UTERINE SCAR FROM PREVIOUS SURGERY: Chronic | ICD-10-CM

## 2023-11-09 LAB
HCG UR QL: NEGATIVE — SIGNIFICANT CHANGE UP
HCG UR QL: NEGATIVE — SIGNIFICANT CHANGE UP

## 2023-11-09 PROCEDURE — 67311 REVISE EYE MUSCLE: CPT | Mod: 50

## 2023-11-09 PROCEDURE — 81025 URINE PREGNANCY TEST: CPT

## 2023-11-09 NOTE — ASU PATIENT PROFILE, ADULT - NSICDXPASTSURGICALHX_GEN_ALL_CORE_FT
PAST SURGICAL HISTORY:  History of dilatation and curettage      PAST SURGICAL HISTORY:  History of dilatation and curettage     S/P

## 2023-11-09 NOTE — ASU DISCHARGE PLAN (ADULT/PEDIATRIC) - CARE PROVIDER_API CALL
Shabbir Faye  Pediatric Ophthalmology  [address] [place] [address], [facility] [address]  [place] [address]  Phone: [phone]  Fax: [phone]  Scheduled Appointment: 11/14/2023

## 2023-11-09 NOTE — ASU DISCHARGE PLAN (ADULT/PEDIATRIC) - ASU DC SPECIAL INSTRUCTIONSFT
Please use maxitrol 3x per day in both eyes for 3 days.    Patient will followup with Dr. pacheco on 11/14.

## 2023-11-09 NOTE — ASU PATIENT PROFILE, ADULT - NSICDXPASTMEDICALHX_GEN_ALL_CORE_FT
PAST MEDICAL HISTORY:  Glaucoma suspect of left eye     H/O urticaria     H/O: obesity     Herpes on valtrex 500mg since 3/31/21    Mild anemia

## 2023-11-14 ENCOUNTER — NON-APPOINTMENT (OUTPATIENT)
Age: 23
End: 2023-11-14

## 2023-11-14 ENCOUNTER — APPOINTMENT (OUTPATIENT)
Dept: OPHTHALMOLOGY | Facility: CLINIC | Age: 23
End: 2023-11-14
Payer: COMMERCIAL

## 2023-11-14 PROBLEM — Z86.39 PERSONAL HISTORY OF OTHER ENDOCRINE, NUTRITIONAL AND METABOLIC DISEASE: Chronic | Status: ACTIVE | Noted: 2023-11-09

## 2023-11-14 PROBLEM — Z87.2 PERSONAL HISTORY OF DISEASES OF THE SKIN AND SUBCUTANEOUS TISSUE: Chronic | Status: ACTIVE | Noted: 2023-11-09

## 2023-11-14 PROBLEM — D64.9 ANEMIA, UNSPECIFIED: Chronic | Status: ACTIVE | Noted: 2023-11-09

## 2023-11-14 PROCEDURE — 99024 POSTOP FOLLOW-UP VISIT: CPT

## 2023-12-04 NOTE — OB RN PATIENT PROFILE - BREASTFEEDING PROVIDES STABLE TEMPERATURE THROUGH SKIN TO SKIN CONTACT
Statement Selected

## 2023-12-15 ENCOUNTER — NON-APPOINTMENT (OUTPATIENT)
Age: 23
End: 2023-12-15

## 2023-12-15 ENCOUNTER — APPOINTMENT (OUTPATIENT)
Dept: OPHTHALMOLOGY | Facility: CLINIC | Age: 23
End: 2023-12-15
Payer: COMMERCIAL

## 2023-12-15 PROCEDURE — 99024 POSTOP FOLLOW-UP VISIT: CPT

## 2024-04-16 ENCOUNTER — APPOINTMENT (OUTPATIENT)
Dept: OPHTHALMOLOGY | Facility: CLINIC | Age: 24
End: 2024-04-16

## 2024-06-27 NOTE — ASU PATIENT PROFILE, ADULT - PAIN CHRONIC, PROFILE
Concern for DRESS clinically, started on IV and topical steroids. DRESS confirmed on pathologyID recommends to continue holding antibiotics in the setting of DRESS.     - antihistamine, topical steroid  - IV methylpred, taper to be determined based off the following labs (each disease state affects treatement course) - TSH, HHV6, CMV, EMV, and HHV7 unable to be ordered   - derm consulted, agree with DRESS, to continue IV solemedrol, follow their recs.         no

## 2024-08-06 NOTE — OB RN DELIVERY SUMMARY - NS_DELIVERYROOM_OBGYN_ALL_OB_FT
"Subjective   Patient ID [id number]   Melinda Tran is a 28 y.o.  at 33w4d with a working estimated date of delivery of 2024, by Last Menstrual Period who presents with her daughter for a routine prenatal visit. She endorses regular fetal movement and denies HA, blurred vision, chest or RUQ pain, abdominal or urinary discomfort, vaginal bleeding or LOF, or edema.     Her pregnancy is complicated by:  -h/o HELLP syndrome,  -h/o PPH with Estefania use  -h/o IUFD at 29w with abruption, unknown etiology     We reviewed her recent US, anticipate upcoming care, PEC prevention strategies, and general pregnancy care. We reviewed her recent OB triage care including positive yeast on gram stain; she has not been treated and opts for treatment today. She will monitor for and report any additional episodes of bleeding. Her questions were answered to her satisfaction and she verbalized understanding to all topics today.     Objective   Physical Exam: NAD, easy respiratory effort, CN grossly intact, no edema; alert & oriented @ baseline   Weight: 89.4 kg (197 lb)  Expected Total Weight Gain: 7 kg (15 lb)-11.5 kg (25 lb)   Pregravid BMI: 27.40  BP: 110/78                  Prenatal Labs  Urine Dip:  Lab Results   Component Value Date    KETONESU Negative 2024    GLUCOSEUR NEGATIVE 2024    LEUKOCYTESUR NEGATIVE 2024     Lab Results   Component Value Date    HGB 12.0 2024    HCT 36.6 2024    ABO B 2024    HEPBSAG Nonreactive 2024     No results found for: \"PAPPA\", \"AFP\", \"HCG\", \"ESTRIOL\", \"INHBA\"  No results found for: \"GLUF\", \"GLUT1\", \"ZPDYKCK1OA\", \"YUUDKHF9XQ\"    Imaging  See imaging reports.     Melinda was seen today for routine prenatal visit.  Diagnoses and all orders for this visit:  History of pre-eclampsia in prior pregnancy, currently pregnant ([facility]) (Primary)  History of stillbirth in pregnant patient in second trimester, antepartum ([facility])  -     Labor Induction; " Future  33 weeks gestation of pregnancy ([facility])  History of HELLP syndrome, currently pregnant ([facility])  -     Labor Induction; Future  Subchorionic hematoma in third trimester, single or unspecified fetus ([facility])  Comments:  growth scans and weekly BPP, followed with MFM     Continue prenatal vitamin and ASA.  Labs reviewed.  Expected mode of delivery   Follow up in 2 weeks for a routine prenatal visit.    Megha Ocampo, APRN-CNM, APRN-CNP    OR 2

## (undated) DEVICE — SPONGE OPHTHALMIC ALCON SPEAR

## (undated) DEVICE — DRSG STERISTRIPS 0.5 X 4"

## (undated) DEVICE — VENODYNE/SCD SLEEVE CALF MEDIUM

## (undated) DEVICE — DRAPE SURGICAL #1010

## (undated) DEVICE — DRAPE TOWEL BLUE 17" X 24"

## (undated) DEVICE — SOL BALANCE SALT 15ML

## (undated) DEVICE — WARMING BLANKET LOWER ADULT

## (undated) DEVICE — SUT VICRYL 6-0 12" S-29 DA

## (undated) DEVICE — DRAPE 1/2 SHEET 40X57"

## (undated) DEVICE — PACK OPTH STRAB